# Patient Record
Sex: FEMALE | Race: WHITE | Employment: FULL TIME | URBAN - METROPOLITAN AREA
[De-identification: names, ages, dates, MRNs, and addresses within clinical notes are randomized per-mention and may not be internally consistent; named-entity substitution may affect disease eponyms.]

---

## 2019-02-26 ENCOUNTER — HOSPITAL ENCOUNTER (EMERGENCY)
Age: 64
Discharge: HOME | End: 2019-02-26
Attending: EMERGENCY MEDICINE
Payer: COMMERCIAL

## 2019-02-26 VITALS
HEIGHT: 66 IN | SYSTOLIC BLOOD PRESSURE: 180 MMHG | OXYGEN SATURATION: 98 % | RESPIRATION RATE: 17 BRPM | BODY MASS INDEX: 30.53 KG/M2 | WEIGHT: 190 LBS | TEMPERATURE: 97.5 F | DIASTOLIC BLOOD PRESSURE: 98 MMHG | HEART RATE: 88 BPM

## 2019-02-26 DIAGNOSIS — I10 HYPERTENSION, UNSPECIFIED TYPE: Primary | ICD-10-CM

## 2019-02-26 LAB
ALBUMIN SERPL-MCNC: 4.4 GM/DL (ref 3.4–5)
ALP BLD-CCNC: 103 IU/L (ref 40–129)
ALT SERPL-CCNC: 18 U/L (ref 10–40)
ANION GAP SERPL CALCULATED.3IONS-SCNC: 13 MMOL/L (ref 4–16)
AST SERPL-CCNC: 14 IU/L (ref 15–37)
BACTERIA: ABNORMAL /HPF
BASOPHILS ABSOLUTE: 0 K/CU MM
BASOPHILS RELATIVE PERCENT: 0.6 % (ref 0–1)
BILIRUB SERPL-MCNC: 0.3 MG/DL (ref 0–1)
BILIRUBIN URINE: NEGATIVE MG/DL
BLOOD, URINE: NEGATIVE
BUN BLDV-MCNC: 14 MG/DL (ref 6–23)
CALCIUM SERPL-MCNC: 8.9 MG/DL (ref 8.3–10.6)
CHLORIDE BLD-SCNC: 102 MMOL/L (ref 99–110)
CLARITY: CLEAR
CO2: 26 MMOL/L (ref 21–32)
COLOR: ABNORMAL
CREAT SERPL-MCNC: 0.8 MG/DL (ref 0.6–1.1)
DIFFERENTIAL TYPE: ABNORMAL
EOSINOPHILS ABSOLUTE: 0.3 K/CU MM
EOSINOPHILS RELATIVE PERCENT: 4.3 % (ref 0–3)
GFR AFRICAN AMERICAN: >60 ML/MIN/1.73M2
GFR NON-AFRICAN AMERICAN: >60 ML/MIN/1.73M2
GLUCOSE BLD-MCNC: 117 MG/DL (ref 70–99)
GLUCOSE, URINE: NEGATIVE MG/DL
HCT VFR BLD CALC: 47.9 % (ref 37–47)
HEMOGLOBIN: 14.9 GM/DL (ref 12.5–16)
IMMATURE NEUTROPHIL %: 0.3 % (ref 0–0.43)
KETONES, URINE: NEGATIVE MG/DL
LEUKOCYTE ESTERASE, URINE: NEGATIVE
LYMPHOCYTES ABSOLUTE: 2.1 K/CU MM
LYMPHOCYTES RELATIVE PERCENT: 30.3 % (ref 24–44)
MCH RBC QN AUTO: 30.3 PG (ref 27–31)
MCHC RBC AUTO-ENTMCNC: 31.1 % (ref 32–36)
MCV RBC AUTO: 97.4 FL (ref 78–100)
MONOCYTES ABSOLUTE: 0.3 K/CU MM
MONOCYTES RELATIVE PERCENT: 4.6 % (ref 0–4)
NITRITE URINE, QUANTITATIVE: NEGATIVE
NUCLEATED RBC %: 0 %
PDW BLD-RTO: 12.8 % (ref 11.7–14.9)
PH, URINE: 6 (ref 5–8)
PLATELET # BLD: 240 K/CU MM (ref 140–440)
PMV BLD AUTO: 10.4 FL (ref 7.5–11.1)
POTASSIUM SERPL-SCNC: 3.6 MMOL/L (ref 3.5–5.1)
PROTEIN UA: NEGATIVE MG/DL
RBC # BLD: 4.92 M/CU MM (ref 4.2–5.4)
RBC URINE: ABNORMAL /HPF (ref 0–6)
SEGMENTED NEUTROPHILS ABSOLUTE COUNT: 4.1 K/CU MM
SEGMENTED NEUTROPHILS RELATIVE PERCENT: 59.9 % (ref 36–66)
SODIUM BLD-SCNC: 141 MMOL/L (ref 135–145)
SPECIFIC GRAVITY UA: 1 (ref 1–1.03)
SQUAMOUS EPITHELIAL: <1 /HPF
TOTAL IMMATURE NEUTOROPHIL: 0.02 K/CU MM
TOTAL NUCLEATED RBC: 0 K/CU MM
TOTAL PROTEIN: 7.1 GM/DL (ref 6.4–8.2)
TRICHOMONAS: ABNORMAL /HPF
UROBILINOGEN, URINE: NORMAL MG/DL (ref 0.2–1)
WBC # BLD: 6.9 K/CU MM (ref 4–10.5)
WBC UA: ABNORMAL /HPF (ref 0–5)

## 2019-02-26 PROCEDURE — 93010 ELECTROCARDIOGRAM REPORT: CPT | Performed by: INTERNAL MEDICINE

## 2019-02-26 PROCEDURE — 85025 COMPLETE CBC W/AUTO DIFF WBC: CPT

## 2019-02-26 PROCEDURE — 80053 COMPREHEN METABOLIC PANEL: CPT

## 2019-02-26 PROCEDURE — 99284 EMERGENCY DEPT VISIT MOD MDM: CPT

## 2019-02-26 PROCEDURE — 93005 ELECTROCARDIOGRAM TRACING: CPT | Performed by: EMERGENCY MEDICINE

## 2019-02-26 PROCEDURE — 81001 URINALYSIS AUTO W/SCOPE: CPT

## 2019-02-26 PROCEDURE — 36415 COLL VENOUS BLD VENIPUNCTURE: CPT

## 2019-02-26 RX ORDER — METOPROLOL SUCCINATE 25 MG/1
25 TABLET, EXTENDED RELEASE ORAL DAILY
Qty: 30 TABLET | Refills: 0 | Status: SHIPPED | OUTPATIENT
Start: 2019-02-26 | End: 2022-04-25

## 2019-03-04 LAB
EKG ATRIAL RATE: 114 BPM
EKG DIAGNOSIS: NORMAL
EKG P AXIS: 47 DEGREES
EKG P-R INTERVAL: 152 MS
EKG Q-T INTERVAL: 348 MS
EKG QRS DURATION: 94 MS
EKG QTC CALCULATION (BAZETT): 479 MS
EKG R AXIS: 47 DEGREES
EKG T AXIS: 61 DEGREES
EKG VENTRICULAR RATE: 114 BPM

## 2019-08-11 ENCOUNTER — APPOINTMENT (OUTPATIENT)
Dept: CT IMAGING | Age: 64
End: 2019-08-11
Payer: COMMERCIAL

## 2019-08-11 ENCOUNTER — HOSPITAL ENCOUNTER (EMERGENCY)
Age: 64
Discharge: HOME | End: 2019-08-11
Attending: EMERGENCY MEDICINE
Payer: COMMERCIAL

## 2019-08-11 VITALS
HEART RATE: 100 BPM | HEIGHT: 65 IN | DIASTOLIC BLOOD PRESSURE: 77 MMHG | TEMPERATURE: 97.9 F | RESPIRATION RATE: 18 BRPM | SYSTOLIC BLOOD PRESSURE: 142 MMHG | BODY MASS INDEX: 31.65 KG/M2 | OXYGEN SATURATION: 97 % | WEIGHT: 190 LBS

## 2019-08-11 DIAGNOSIS — R10.30 LOWER ABDOMINAL PAIN: Primary | ICD-10-CM

## 2019-08-11 LAB
ALBUMIN SERPL-MCNC: 4.2 GM/DL (ref 3.4–5)
ALP BLD-CCNC: 103 IU/L (ref 40–129)
ALT SERPL-CCNC: 19 U/L (ref 10–40)
ANION GAP SERPL CALCULATED.3IONS-SCNC: 11 MMOL/L (ref 4–16)
AST SERPL-CCNC: 15 IU/L (ref 15–37)
BACTERIA: NEGATIVE /HPF
BASOPHILS ABSOLUTE: 0 K/CU MM
BASOPHILS RELATIVE PERCENT: 0.3 % (ref 0–1)
BILIRUB SERPL-MCNC: 0.5 MG/DL (ref 0–1)
BILIRUBIN URINE: NEGATIVE MG/DL
BLOOD, URINE: NEGATIVE
BUN BLDV-MCNC: 11 MG/DL (ref 6–23)
CALCIUM SERPL-MCNC: 9.5 MG/DL (ref 8.3–10.6)
CHLORIDE BLD-SCNC: 103 MMOL/L (ref 99–110)
CLARITY: CLEAR
CO2: 24 MMOL/L (ref 21–32)
COLOR: ABNORMAL
CREAT SERPL-MCNC: 0.6 MG/DL (ref 0.6–1.1)
DIFFERENTIAL TYPE: ABNORMAL
EOSINOPHILS ABSOLUTE: 0.1 K/CU MM
EOSINOPHILS RELATIVE PERCENT: 1.2 % (ref 0–3)
GFR AFRICAN AMERICAN: >60 ML/MIN/1.73M2
GFR NON-AFRICAN AMERICAN: >60 ML/MIN/1.73M2
GLUCOSE BLD-MCNC: 111 MG/DL (ref 70–99)
GLUCOSE, URINE: NEGATIVE MG/DL
HCT VFR BLD CALC: 42.4 % (ref 37–47)
HEMOGLOBIN: 13.7 GM/DL (ref 12.5–16)
IMMATURE NEUTROPHIL %: 0.2 % (ref 0–0.43)
KETONES, URINE: NEGATIVE MG/DL
LEUKOCYTE ESTERASE, URINE: NEGATIVE
LIPASE: 90 IU/L (ref 13–60)
LYMPHOCYTES ABSOLUTE: 1.3 K/CU MM
LYMPHOCYTES RELATIVE PERCENT: 14.6 % (ref 24–44)
MCH RBC QN AUTO: 30.3 PG (ref 27–31)
MCHC RBC AUTO-ENTMCNC: 32.3 % (ref 32–36)
MCV RBC AUTO: 93.8 FL (ref 78–100)
MONOCYTES ABSOLUTE: 0.5 K/CU MM
MONOCYTES RELATIVE PERCENT: 5.1 % (ref 0–4)
NITRITE URINE, QUANTITATIVE: NEGATIVE
NUCLEATED RBC %: 0 %
PDW BLD-RTO: 13.1 % (ref 11.7–14.9)
PH, URINE: 6 (ref 5–8)
PLATELET # BLD: 206 K/CU MM (ref 140–440)
PMV BLD AUTO: 10.2 FL (ref 7.5–11.1)
POTASSIUM SERPL-SCNC: 3.6 MMOL/L (ref 3.5–5.1)
PROTEIN UA: NEGATIVE MG/DL
RBC # BLD: 4.52 M/CU MM (ref 4.2–5.4)
RBC URINE: ABNORMAL /HPF (ref 0–6)
SEGMENTED NEUTROPHILS ABSOLUTE COUNT: 7.1 K/CU MM
SEGMENTED NEUTROPHILS RELATIVE PERCENT: 78.6 % (ref 36–66)
SODIUM BLD-SCNC: 138 MMOL/L (ref 135–145)
SPECIFIC GRAVITY UA: 1.01 (ref 1–1.03)
SQUAMOUS EPITHELIAL: 1 /HPF
TOTAL IMMATURE NEUTOROPHIL: 0.02 K/CU MM
TOTAL NUCLEATED RBC: 0 K/CU MM
TOTAL PROTEIN: 6.9 GM/DL (ref 6.4–8.2)
TRICHOMONAS: ABNORMAL /HPF
UROBILINOGEN, URINE: NORMAL MG/DL (ref 0.2–1)
WBC # BLD: 9 K/CU MM (ref 4–10.5)
WBC UA: <1 /HPF (ref 0–5)

## 2019-08-11 PROCEDURE — 83690 ASSAY OF LIPASE: CPT

## 2019-08-11 PROCEDURE — 74176 CT ABD & PELVIS W/O CONTRAST: CPT

## 2019-08-11 PROCEDURE — 85025 COMPLETE CBC W/AUTO DIFF WBC: CPT

## 2019-08-11 PROCEDURE — 81001 URINALYSIS AUTO W/SCOPE: CPT

## 2019-08-11 PROCEDURE — 99284 EMERGENCY DEPT VISIT MOD MDM: CPT

## 2019-08-11 PROCEDURE — 80053 COMPREHEN METABOLIC PANEL: CPT

## 2019-08-11 RX ORDER — DICYCLOMINE HYDROCHLORIDE 10 MG/1
10 CAPSULE ORAL EVERY 6 HOURS PRN
Qty: 20 CAPSULE | Refills: 0 | Status: SHIPPED | OUTPATIENT
Start: 2019-08-11 | End: 2022-04-25

## 2019-08-11 RX ORDER — HYDROCODONE BITARTRATE AND ACETAMINOPHEN 5; 325 MG/1; MG/1
1 TABLET ORAL EVERY 6 HOURS PRN
Qty: 5 TABLET | Refills: 0 | Status: SHIPPED | OUTPATIENT
Start: 2019-08-11 | End: 2019-08-14

## 2019-08-11 ASSESSMENT — PAIN DESCRIPTION - PAIN TYPE: TYPE: ACUTE PAIN

## 2019-08-11 ASSESSMENT — PAIN SCALES - GENERAL: PAINLEVEL_OUTOF10: 9

## 2019-08-11 ASSESSMENT — PAIN DESCRIPTION - LOCATION: LOCATION: ABDOMEN

## 2019-08-12 NOTE — ED PROVIDER NOTES
m) 190 lb (86.2 kg)       Physical Exam  General appearance: Alert, cooperative, no distress, appears stated age. Head:  Normocephalic, without obvious abnormality, atraumatic. HEENT: Mucous membranes moist.  Neck: Full ROM, trachea midline, no JVD  Lungs: No respiratory distress  Cardiovasular: Perfusing extremities  Abdomen: Mild lower midline, left, right tenderness palpation without rebound, mild. No pulsatile masses. Extremities: Atraumatic, full ROM  Skin: No rashes or lesions to exposed skin  Neurologic: Alert and oriented x3, motor grossly normal, clear speech      DIAGNOSTIC RESULTS     EKG:     RADIOLOGY:   Non-plain film images such as CT, Ultrasound and MRI are read by the radiologist.Plain radiographic images are visualized and preliminarily interpreted by the emergency physician with the below findings:        Interpretation per the Radiologist below, if available at the time of this note:    CT ABDOMEN PELVIS WO CONTRAST   Preliminary Result   1. No acute findings within the abdomen or pelvis. No evidence of   obstructive uropathy or appendicitis. Scattered diverticulosis without acute   features. 2. Previous hysterectomy.                EDBEDSIDE ULTRASOUND:   Performed by Lucy Nath - none    LABS:  Labs Reviewed   CBC WITH AUTO DIFFERENTIAL - Abnormal; Notable for the following components:       Result Value    Segs Relative 78.6 (*)     Lymphocytes % 14.6 (*)     Monocytes % 5.1 (*)     All other components within normal limits   COMPREHENSIVE METABOLIC PANEL W/ REFLEX TO MG FOR LOW K - Abnormal; Notable for the following components:    Glucose 111 (*)     All other components within normal limits   LIPASE - Abnormal; Notable for the following components:    Lipase 90 (*)     All other components within normal limits   URINE RT REFLEX TO CULTURE - Abnormal; Notable for the following components:    Color, UA STRAW (*)     All other components within normal limits       All other labs were

## 2020-03-15 ENCOUNTER — HOSPITAL ENCOUNTER (EMERGENCY)
Age: 65
Discharge: HOME | End: 2020-03-15

## 2020-03-15 VITALS
DIASTOLIC BLOOD PRESSURE: 89 MMHG | HEART RATE: 87 BPM | RESPIRATION RATE: 17 BRPM | TEMPERATURE: 98.9 F | SYSTOLIC BLOOD PRESSURE: 149 MMHG | OXYGEN SATURATION: 98 %

## 2020-03-15 PROCEDURE — 99282 EMERGENCY DEPT VISIT SF MDM: CPT

## 2020-03-15 PROCEDURE — 6370000000 HC RX 637 (ALT 250 FOR IP): Performed by: PHYSICIAN ASSISTANT

## 2020-03-15 RX ORDER — PREDNISONE 20 MG/1
60 TABLET ORAL ONCE
Status: COMPLETED | OUTPATIENT
Start: 2020-03-15 | End: 2020-03-15

## 2020-03-15 RX ORDER — METHYLPREDNISOLONE 4 MG/1
TABLET ORAL
Qty: 1 KIT | Refills: 0 | Status: SHIPPED | OUTPATIENT
Start: 2020-03-15 | End: 2020-03-21

## 2020-03-15 RX ORDER — VALACYCLOVIR HYDROCHLORIDE 1 G/1
1000 TABLET, FILM COATED ORAL 3 TIMES DAILY
Qty: 21 TABLET | Refills: 0 | Status: SHIPPED | OUTPATIENT
Start: 2020-03-15 | End: 2020-03-22

## 2020-03-15 RX ORDER — LIDOCAINE 50 MG/G
1 PATCH TOPICAL DAILY
Qty: 10 PATCH | Refills: 0 | Status: SHIPPED | OUTPATIENT
Start: 2020-03-15 | End: 2020-03-25

## 2020-03-15 RX ADMIN — PREDNISONE 60 MG: 20 TABLET ORAL at 18:59

## 2020-03-15 ASSESSMENT — PAIN DESCRIPTION - ORIENTATION: ORIENTATION: RIGHT;UPPER

## 2020-03-15 ASSESSMENT — PAIN DESCRIPTION - LOCATION: LOCATION: BACK

## 2020-03-15 ASSESSMENT — PAIN DESCRIPTION - PAIN TYPE: TYPE: ACUTE PAIN

## 2020-03-15 ASSESSMENT — PAIN DESCRIPTION - DESCRIPTORS: DESCRIPTORS: BURNING

## 2020-03-15 ASSESSMENT — PAIN SCALES - GENERAL: PAINLEVEL_OUTOF10: 6

## 2020-03-15 NOTE — ED PROVIDER NOTES
EMERGENCY DEPARTMENT ENCOUNTER      PCP: [address] [place] [address] Drive    Chief Complaint   Patient presents with    Rash     to uppper back          This patient was not evaluated by the attending physician. I have independently evaluated this patient . HPI    Lexi Kaur is a 75 y.o. female who presents to emergency department today with concern of a itching, painful rash to the right upper back. States that over the last several days she has had a itchy feeling and \"gnawing\" feeling to the back. Is now manifest into a maculopapular rash into the right upper back. States that there was a component \"underneath her breast\". 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 She is in no history of shingles outbreak. Denies any immunocompromise. Is not a diabetic. No other component of the rash. No other symptoms. Did state that she had a upper respiratory viral illness several weeks ago but has since gotten better. REVIEW OF SYSTEMS    General: Denies fevers or syncope  ENT: Denies throat swelling or tongue swelling  Pulmonary: Denies wheezes, difficulty breathing,  or chest tightness  Skin: See HPI    All other review of systems are negative  See HPI and nursing notes for additional information     PAST MEDICAL & SURGICAL HISTORY    Past Medical History:   Diagnosis Date    Allergic rhinitis     Obesity      Past Surgical History:   Procedure Laterality Date    HYSTERECTOMY, TOTAL ABDOMINAL      TONSILLECTOMY         CURRENT MEDICATIONS    Current Outpatient Rx   Medication Sig Dispense Refill    valACYclovir (VALTREX) 1 g tablet Take 1 tablet by mouth 3 times daily for 7 days 21 tablet 0    methylPREDNISolone (MEDROL DOSEPACK) 4 MG tablet Take by mouth. 1 kit 0    lidocaine (LIDODERM) 5 % Place 1 patch onto the skin daily for 10 days 12 hours on, 12 hours off.  10 patch 0    dicyclomine (BENTYL) 10 MG capsule Take 1 capsule by mouth every 6 hours as needed (cramps) 20 capsule 0    metoprolol succinate (TOPROL XL) 25 MG extended release tablet Take 1 tablet by mouth daily 30 tablet 0    naproxen (NAPROSYN) 500 MG tablet Take 1 tablet by mouth 2 times daily as needed for Pain 30 tablet 0    celecoxib (CELEBREX) 200 MG capsule Take 1 capsule by mouth daily.  30 capsule 5       ALLERGIES    Allergies   Allergen Reactions    Pcn [Penicillins] Swelling and Rash    Sulfa Antibiotics Rash       SOCIAL & FAMILY HISTORY    Social History     Socioeconomic History    Marital status:      Spouse name: None    Number of children: None    Years of education: None    Highest education level: None   Occupational History    None   Social Needs    Financial resource strain: None    Food insecurity     Worry: None     Inability: None    Transportation needs     Medical: None     Non-medical: None   Tobacco Use    Smoking status: Former Smoker     Packs/day: 0.50     Years: 20.00     Pack years: 10.00     Types: Cigarettes     Last attempt to quit: 6/10/1991     Years since quittin.7    Smokeless tobacco: Never Used   Substance and Sexual Activity    Alcohol use: No    Drug use: No    Sexual activity: None   Lifestyle    Physical activity     Days per week: None     Minutes per session: None    Stress: None   Relationships    Social connections     Talks on phone: None     Gets together: None     Attends Scientologist service: None     Active member of club or organization: None     Attends meetings of clubs or organizations: None     Relationship status: None    Intimate partner violence     Fear of current or ex partner: None     Emotionally abused: None     Physically abused: None     Forced sexual activity: None   Other Topics Concern    None   Social History Narrative    None     Family History   Problem Relation Age of Onset    Cancer Other         aunt    Diabetes Mother     Hypertension Mother     Heart

## 2021-12-07 ENCOUNTER — HOSPITAL ENCOUNTER (EMERGENCY)
Age: 66
Discharge: HOME | End: 2021-12-07
Attending: EMERGENCY MEDICINE
Payer: COMMERCIAL

## 2021-12-07 VITALS
BODY MASS INDEX: 30.53 KG/M2 | SYSTOLIC BLOOD PRESSURE: 120 MMHG | RESPIRATION RATE: 15 BRPM | WEIGHT: 190 LBS | TEMPERATURE: 98.1 F | HEART RATE: 90 BPM | DIASTOLIC BLOOD PRESSURE: 96 MMHG | OXYGEN SATURATION: 96 % | HEIGHT: 66 IN

## 2021-12-07 DIAGNOSIS — B34.9 VIRAL ILLNESS: ICD-10-CM

## 2021-12-07 DIAGNOSIS — H65.01 RIGHT ACUTE SEROUS OTITIS MEDIA, RECURRENCE NOT SPECIFIED: Primary | ICD-10-CM

## 2021-12-07 PROCEDURE — 87430 STREP A AG IA: CPT

## 2021-12-07 PROCEDURE — U0005 INFEC AGEN DETEC AMPLI PROBE: HCPCS

## 2021-12-07 PROCEDURE — 87081 CULTURE SCREEN ONLY: CPT

## 2021-12-07 PROCEDURE — U0003 INFECTIOUS AGENT DETECTION BY NUCLEIC ACID (DNA OR RNA); SEVERE ACUTE RESPIRATORY SYNDROME CORONAVIRUS 2 (SARS-COV-2) (CORONAVIRUS DISEASE [COVID-19]), AMPLIFIED PROBE TECHNIQUE, MAKING USE OF HIGH THROUGHPUT TECHNOLOGIES AS DESCRIBED BY CMS-2020-01-R: HCPCS

## 2021-12-07 PROCEDURE — 99284 EMERGENCY DEPT VISIT MOD MDM: CPT

## 2021-12-07 RX ORDER — CEFDINIR 300 MG/1
600 CAPSULE ORAL DAILY
Qty: 14 CAPSULE | Refills: 0 | Status: SHIPPED | OUTPATIENT
Start: 2021-12-07 | End: 2021-12-14

## 2021-12-07 NOTE — ED TRIAGE NOTES
Pt arrived via triage with c/o body aches, sore throat and cough. Pt reports symptoms since Thursday. Pt reports she is not vaccinated against Covid 19. Pt is alert, oriented and ambulatory. Pt reports taking OTC meds.

## 2021-12-07 NOTE — ED PROVIDER NOTES
Triage Chief Complaint:   URI      [place]:  Katherleen Pallas is a 74 y.o. female that presents to the emergency department with chills that started 5 days ago. She then developed a hoarse and irritated throat and right ear pain. She eventually lost her voice but it is back now. She used a friends nebulizer machine two days ago that \"got my voice back. \" She developed a fever yesterday and has been taking tylenol since then. No drainage out of the right ear. No chest pain or shortness of breath.  .    Past Medical History:   Diagnosis Date    Allergic rhinitis     Obesity      Past Surgical History:   Procedure Laterality Date    HYSTERECTOMY, TOTAL ABDOMINAL      TONSILLECTOMY       Family History   Problem Relation Age of Onset    Cancer Other         aunt    Diabetes Mother     Hypertension Mother     Heart Disease Father     Heart Disease Brother     Heart Disease Other         grandmother    Hypertension Brother     Hypertension Son     Other Brother         chronic lung disease    Other Son         chronic lung disease    Elevated Lipids Brother     Elevated Lipids Son      Social History     Socioeconomic History    Marital status:      Spouse name: Not on file    Number of children: Not on file    Years of education: Not on file    Highest education level: Not on file   Occupational History    Not on file   Tobacco Use    Smoking status: Former Smoker     Packs/day: 0.50     Years: 20.00     Pack years: 10.00     Types: Cigarettes     Quit date: 6/10/1991     Years since quittin.5    Smokeless tobacco: Never Used   Substance and Sexual Activity    Alcohol use: No    Drug use: No    Sexual activity: Not on file   Other Topics Concern    Not on file   Social History Narrative    Not on file     Social Determinants of Health     Financial Resource Strain:     Difficulty of Paying Living Expenses: Not on file   Food Insecurity:     Worried About Running Out of Food in the Last Year: Not on file    Ran Out of Food in the Last Year: Not on file   Transportation Needs:     Lack of Transportation (Medical): Not on file    Lack of Transportation (Non-Medical): Not on file   Physical Activity:     Days of Exercise per Week: Not on file    Minutes of Exercise per Session: Not on file   Stress:     Feeling of Stress : Not on file   Social Connections:     Frequency of Communication with Friends and Family: Not on file    Frequency of Social Gatherings with Friends and Family: Not on file    Attends Lutheran Services: Not on file    Active Member of [address] [place] [address] [employer] or Organizations: Not on file    Attends Club or Organization Meetings: Not on file    Marital Status: Not on file   Intimate Partner Violence:     Fear of Current or Ex-Partner: Not on file    Emotionally Abused: Not on file    Physically Abused: Not on file    Sexually Abused: Not on file   Housing Stability:     Unable to Pay for Housing in the Last Year: Not on file    Number of [place] in the Last Year: Not on file    Unstable Housing in the Last Year: Not on file     No current facility-administered medications for this encounter. Current Outpatient Medications   Medication Sig Dispense Refill    dicyclomine (BENTYL) 10 MG capsule Take 1 capsule by mouth every 6 hours as needed (cramps) 20 capsule 0    metoprolol succinate (TOPROL XL) 25 MG extended release tablet Take 1 tablet by mouth daily 30 tablet 0    naproxen (NAPROSYN) 500 MG tablet Take 1 tablet by mouth 2 times daily as needed for Pain 30 tablet 0    celecoxib (CELEBREX) 200 MG capsule Take 1 capsule by mouth daily. 30 capsule 5     Allergies   Allergen Reactions    Pcn [Penicillins] Swelling and Rash    Sulfa Antibiotics Rash     Nursing Notes Reviewed    ROS:  At least 10 systems reviewed and otherwise negative except as in the [address]     Physical Exam:  ED Triage Vitals [12/07/21 1659]   Enc Vitals Group      BP (!) 120/96      Pulse 90 Resp 15      Temp 98.1 °F (36.7 °C)      Temp src       SpO2 96 %      Weight 190 lb (86.2 kg)      Height 5' 6\" (1.676 m)      Head Circumference       Peak Flow       Pain Score       Pain Loc       Pain Edu? Excl. in [address]? My pulse oximetry interpretation is which is within the normal range    GENERAL APPEARANCE: Awake and alert. Cooperative. No acute distress. HEAD:  Atraumatic. EYES: EOM's grossly intact. ENT: Mucous membranes are moist.  No trismus. Left TM unremarkable. Right TM bulging with erythema in the canal.  Oropharynx clear. Uvula midline. NECK:  Trachea midline. HEART: RRR. Radial pulses 2+. LUNGS: Respirations unlabored. CTAB  ABDOMEN: Soft. Non-tender. No guarding or rebound. EXTREMITIES: No acute deformities. SKIN: Warm and dry. NEUROLOGICAL: No gross facial drooping. Moves all 4 extremities spontaneously. PSYCHIATRIC: Normal mood. I have reviewed and interpreted all of the currently available lab results from this visit (if applicable):  No results found for this visit on 12/07/21. EKG: (All EKG's are interpreted by myself in the absence of a cardiologist)      MDM:  Patient's vital signs are stable. She is awake and alert and in no acute distress. I did obtain a rapid strep screen given her sore throat. This is negative. I did also send out a Covid test.  Patient instructed that this will return in the next 1 to 2 days and she should isolate until this test result returns. She does appear to have an acute otitis media and I will start her on an antibiotic for this. Patient instructed to continue Tylenol as needed for fever and body aches and follow-up in the next few days. Clinical Impression:  1. Right acute serous otitis media, recurrence not specified    2. Viral illness        Disposition Vitals:  [unfilled], [unfilled], [unfilled], [unfilled]    Disposition referral (if applicable):  Robert Vera MD  [address] [place]    Schedule an appointment as soon as possible for a visit in 2 days  If symptoms worsen      Disposition medications (if applicable):  New Prescriptions    No medications on file         (Please note that portions of this note may have been completed with a voice recognition program. Efforts were made to edit the dictations but occasionally words are mis-transcribed.)    MD Monie Forbes MD  12/07/21 [id number]

## 2021-12-07 NOTE — Clinical Note
Waldemar Trejo was seen and treated in our emergency department on 12/7/2021. She may return to work on 12/09/2021. May return to work on Thursday if COVID test is negative. If you have any questions or concerns, please don't hesitate to call.       Monie Michael MD

## 2021-12-07 NOTE — Clinical Note
Yarely Anaya was seen and treated in our emergency department on 12/7/2021. She may return to work on 12/09/2021. May return to work on Thursday if COVID test is negative. If you have any questions or concerns, please don't hesitate to call.       Ledy Francois MD

## 2021-12-08 LAB — SARS-COV-2: NOT DETECTED

## 2021-12-09 LAB
CULTURE: NORMAL
Lab: NORMAL
SPECIMEN: NORMAL
STREP A DIRECT SCREEN: NEGATIVE

## 2022-04-25 ENCOUNTER — HOSPITAL ENCOUNTER (OUTPATIENT)
Age: 67
Setting detail: OBSERVATION
Discharge: HOME | End: 2022-04-27
Attending: STUDENT IN AN ORGANIZED HEALTH CARE EDUCATION/TRAINING PROGRAM | Admitting: STUDENT IN AN ORGANIZED HEALTH CARE EDUCATION/TRAINING PROGRAM
Payer: COMMERCIAL

## 2022-04-25 ENCOUNTER — APPOINTMENT (OUTPATIENT)
Dept: GENERAL RADIOLOGY | Age: 67
End: 2022-04-25
Payer: COMMERCIAL

## 2022-04-25 ENCOUNTER — APPOINTMENT (OUTPATIENT)
Dept: CT IMAGING | Age: 67
End: 2022-04-25
Payer: COMMERCIAL

## 2022-04-25 DIAGNOSIS — I16.0 HYPERTENSIVE URGENCY: ICD-10-CM

## 2022-04-25 DIAGNOSIS — R42 LIGHTHEADEDNESS: ICD-10-CM

## 2022-04-25 DIAGNOSIS — R07.9 CHEST PAIN, UNSPECIFIED TYPE: Primary | ICD-10-CM

## 2022-04-25 DIAGNOSIS — R00.0 TACHYCARDIA: ICD-10-CM

## 2022-04-25 DIAGNOSIS — R51.9 ACUTE NONINTRACTABLE HEADACHE, UNSPECIFIED HEADACHE TYPE: ICD-10-CM

## 2022-04-25 PROBLEM — I10 PRIMARY HYPERTENSION: Status: ACTIVE | Noted: 2022-04-25

## 2022-04-25 LAB
ALBUMIN SERPL-MCNC: 4.4 GM/DL (ref 3.4–5)
ALP BLD-CCNC: 113 IU/L (ref 40–129)
ALT SERPL-CCNC: 24 U/L (ref 10–40)
ANION GAP SERPL CALCULATED.3IONS-SCNC: 13 MMOL/L (ref 4–16)
AST SERPL-CCNC: 21 IU/L (ref 15–37)
BACTERIA: NEGATIVE /HPF
BASOPHILS ABSOLUTE: 0.1 K/CU MM
BASOPHILS RELATIVE PERCENT: 0.7 % (ref 0–1)
BILIRUB SERPL-MCNC: 0.4 MG/DL (ref 0–1)
BILIRUBIN URINE: NEGATIVE MG/DL
BLOOD, URINE: NEGATIVE
BUN BLDV-MCNC: 10 MG/DL (ref 6–23)
CALCIUM SERPL-MCNC: 9.2 MG/DL (ref 8.3–10.6)
CHLORIDE BLD-SCNC: 103 MMOL/L (ref 99–110)
CLARITY: CLEAR
CO2: 23 MMOL/L (ref 21–32)
COLOR: YELLOW
CREAT SERPL-MCNC: 0.7 MG/DL (ref 0.6–1.1)
DIFFERENTIAL TYPE: ABNORMAL
EKG ATRIAL RATE: 112 BPM
EKG DIAGNOSIS: NORMAL
EKG P AXIS: 51 DEGREES
EKG P-R INTERVAL: 150 MS
EKG Q-T INTERVAL: 358 MS
EKG QRS DURATION: 86 MS
EKG QTC CALCULATION (BAZETT): 488 MS
EKG R AXIS: -65 DEGREES
EKG T AXIS: 52 DEGREES
EKG VENTRICULAR RATE: 112 BPM
EOSINOPHILS ABSOLUTE: 0.2 K/CU MM
EOSINOPHILS RELATIVE PERCENT: 3.1 % (ref 0–3)
GFR AFRICAN AMERICAN: >60 ML/MIN/1.73M2
GFR NON-AFRICAN AMERICAN: >60 ML/MIN/1.73M2
GLUCOSE BLD-MCNC: 108 MG/DL (ref 70–99)
GLUCOSE, URINE: NEGATIVE MG/DL
HCT VFR BLD CALC: 47.6 % (ref 37–47)
HEMOGLOBIN: 15.4 GM/DL (ref 12.5–16)
IMMATURE NEUTROPHIL %: 0.4 % (ref 0–0.43)
KETONES, URINE: NEGATIVE MG/DL
LEUKOCYTE ESTERASE, URINE: NEGATIVE
LIPASE: 41 IU/L (ref 13–60)
LYMPHOCYTES ABSOLUTE: 2.1 K/CU MM
LYMPHOCYTES RELATIVE PERCENT: 31.4 % (ref 24–44)
MAGNESIUM: 2.1 MG/DL (ref 1.8–2.4)
MCH RBC QN AUTO: 31 PG (ref 27–31)
MCHC RBC AUTO-ENTMCNC: 32.4 % (ref 32–36)
MCV RBC AUTO: 95.8 FL (ref 78–100)
MONOCYTES ABSOLUTE: 0.4 K/CU MM
MONOCYTES RELATIVE PERCENT: 5.2 % (ref 0–4)
NITRITE URINE, QUANTITATIVE: NEGATIVE
NUCLEATED RBC %: 0 %
PDW BLD-RTO: 12.9 % (ref 11.7–14.9)
PH, URINE: 6 (ref 5–8)
PLATELET # BLD: 248 K/CU MM (ref 140–440)
PMV BLD AUTO: 10.1 FL (ref 7.5–11.1)
POTASSIUM SERPL-SCNC: 3.5 MMOL/L (ref 3.5–5.1)
PRO-BNP: 57.3 PG/ML
PROTEIN UA: NEGATIVE MG/DL
RBC # BLD: 4.97 M/CU MM (ref 4.2–5.4)
RBC URINE: <1 /HPF (ref 0–6)
SEGMENTED NEUTROPHILS ABSOLUTE COUNT: 4 K/CU MM
SEGMENTED NEUTROPHILS RELATIVE PERCENT: 59.2 % (ref 36–66)
SODIUM BLD-SCNC: 139 MMOL/L (ref 135–145)
SPECIFIC GRAVITY UA: <1.005 (ref 1–1.03)
SQUAMOUS EPITHELIAL: 1 /HPF
T4 FREE: 1.28 NG/DL (ref 0.9–1.8)
TOTAL IMMATURE NEUTOROPHIL: 0.03 K/CU MM
TOTAL NUCLEATED RBC: 0 K/CU MM
TOTAL PROTEIN: 7.1 GM/DL (ref 6.4–8.2)
TRICHOMONAS: NORMAL /HPF
TROPONIN T: <0.01 NG/ML
TSH HIGH SENSITIVITY: 1.96 UIU/ML (ref 0.27–4.2)
UROBILINOGEN, URINE: 0.2 MG/DL (ref 0.2–1)
WBC # BLD: 6.8 K/CU MM (ref 4–10.5)
WBC UA: <1 /HPF (ref 0–5)

## 2022-04-25 PROCEDURE — 85025 COMPLETE CBC W/AUTO DIFF WBC: CPT

## 2022-04-25 PROCEDURE — 6370000000 HC RX 637 (ALT 250 FOR IP): Performed by: INTERNAL MEDICINE

## 2022-04-25 PROCEDURE — 2580000003 HC RX 258: Performed by: PHYSICIAN ASSISTANT

## 2022-04-25 PROCEDURE — 84443 ASSAY THYROID STIM HORMONE: CPT

## 2022-04-25 PROCEDURE — 70450 CT HEAD/BRAIN W/O DYE: CPT

## 2022-04-25 PROCEDURE — 96361 HYDRATE IV INFUSION ADD-ON: CPT

## 2022-04-25 PROCEDURE — 6360000002 HC RX W HCPCS: Performed by: PHYSICIAN ASSISTANT

## 2022-04-25 PROCEDURE — 81001 URINALYSIS AUTO W/SCOPE: CPT

## 2022-04-25 PROCEDURE — 96372 THER/PROPH/DIAG INJ SC/IM: CPT

## 2022-04-25 PROCEDURE — 80053 COMPREHEN METABOLIC PANEL: CPT

## 2022-04-25 PROCEDURE — 83735 ASSAY OF MAGNESIUM: CPT

## 2022-04-25 PROCEDURE — 93010 ELECTROCARDIOGRAM REPORT: CPT | Performed by: INTERNAL MEDICINE

## 2022-04-25 PROCEDURE — 94761 N-INVAS EAR/PLS OXIMETRY MLT: CPT

## 2022-04-25 PROCEDURE — 84484 ASSAY OF TROPONIN QUANT: CPT

## 2022-04-25 PROCEDURE — 36415 COLL VENOUS BLD VENIPUNCTURE: CPT

## 2022-04-25 PROCEDURE — 6370000000 HC RX 637 (ALT 250 FOR IP): Performed by: PHYSICIAN ASSISTANT

## 2022-04-25 PROCEDURE — 93005 ELECTROCARDIOGRAM TRACING: CPT | Performed by: STUDENT IN AN ORGANIZED HEALTH CARE EDUCATION/TRAINING PROGRAM

## 2022-04-25 PROCEDURE — 99285 EMERGENCY DEPT VISIT HI MDM: CPT

## 2022-04-25 PROCEDURE — 70496 CT ANGIOGRAPHY HEAD: CPT

## 2022-04-25 PROCEDURE — 71275 CT ANGIOGRAPHY CHEST: CPT

## 2022-04-25 PROCEDURE — G0378 HOSPITAL OBSERVATION PER HR: HCPCS

## 2022-04-25 PROCEDURE — 83880 ASSAY OF NATRIURETIC PEPTIDE: CPT

## 2022-04-25 PROCEDURE — 83690 ASSAY OF LIPASE: CPT

## 2022-04-25 PROCEDURE — 71045 X-RAY EXAM CHEST 1 VIEW: CPT

## 2022-04-25 PROCEDURE — 6360000004 HC RX CONTRAST MEDICATION: Performed by: PHYSICIAN ASSISTANT

## 2022-04-25 PROCEDURE — 99203 OFFICE O/P NEW LOW 30 MIN: CPT | Performed by: INTERNAL MEDICINE

## 2022-04-25 PROCEDURE — 84439 ASSAY OF FREE THYROXINE: CPT

## 2022-04-25 PROCEDURE — 6370000000 HC RX 637 (ALT 250 FOR IP)

## 2022-04-25 PROCEDURE — 96375 TX/PRO/DX INJ NEW DRUG ADDON: CPT

## 2022-04-25 PROCEDURE — 96374 THER/PROPH/DIAG INJ IV PUSH: CPT

## 2022-04-25 RX ORDER — ONDANSETRON 2 MG/ML
4 INJECTION INTRAMUSCULAR; INTRAVENOUS EVERY 6 HOURS PRN
Status: DISCONTINUED | OUTPATIENT
Start: 2022-04-25 | End: 2022-04-27 | Stop reason: HOSPADM

## 2022-04-25 RX ORDER — ACETAMINOPHEN 325 MG/1
650 TABLET ORAL EVERY 6 HOURS PRN
Status: DISCONTINUED | OUTPATIENT
Start: 2022-04-25 | End: 2022-04-27 | Stop reason: HOSPADM

## 2022-04-25 RX ORDER — NITROGLYCERIN 0.4 MG/1
0.4 TABLET SUBLINGUAL EVERY 5 MIN PRN
Status: DISCONTINUED | OUTPATIENT
Start: 2022-04-25 | End: 2022-04-27 | Stop reason: HOSPADM

## 2022-04-25 RX ORDER — ATORVASTATIN CALCIUM 40 MG/1
40 TABLET, FILM COATED ORAL NIGHTLY
Status: DISCONTINUED | OUTPATIENT
Start: 2022-04-25 | End: 2022-04-27 | Stop reason: HOSPADM

## 2022-04-25 RX ORDER — 0.9 % SODIUM CHLORIDE 0.9 %
1000 INTRAVENOUS SOLUTION INTRAVENOUS ONCE
Status: COMPLETED | OUTPATIENT
Start: 2022-04-25 | End: 2022-04-25

## 2022-04-25 RX ORDER — SODIUM CHLORIDE 9 MG/ML
INJECTION, SOLUTION INTRAVENOUS PRN
Status: DISCONTINUED | OUTPATIENT
Start: 2022-04-25 | End: 2022-04-27 | Stop reason: HOSPADM

## 2022-04-25 RX ORDER — SODIUM CHLORIDE 0.9 % (FLUSH) 0.9 %
10 SYRINGE (ML) INJECTION EVERY 12 HOURS SCHEDULED
Status: DISCONTINUED | OUTPATIENT
Start: 2022-04-25 | End: 2022-04-27 | Stop reason: HOSPADM

## 2022-04-25 RX ORDER — ASPIRIN 325 MG
325 TABLET ORAL ONCE
Status: COMPLETED | OUTPATIENT
Start: 2022-04-25 | End: 2022-04-25

## 2022-04-25 RX ORDER — NICOTINE 21 MG/24HR
1 PATCH, TRANSDERMAL 24 HOURS TRANSDERMAL DAILY
Status: DISCONTINUED | OUTPATIENT
Start: 2022-04-25 | End: 2022-04-25

## 2022-04-25 RX ORDER — ENOXAPARIN SODIUM 100 MG/ML
40 INJECTION SUBCUTANEOUS DAILY
Status: DISCONTINUED | OUTPATIENT
Start: 2022-04-25 | End: 2022-04-27 | Stop reason: HOSPADM

## 2022-04-25 RX ORDER — DIPHENHYDRAMINE HYDROCHLORIDE 50 MG/ML
25 INJECTION INTRAMUSCULAR; INTRAVENOUS ONCE
Status: COMPLETED | OUTPATIENT
Start: 2022-04-25 | End: 2022-04-25

## 2022-04-25 RX ORDER — MECLIZINE HCL 12.5 MG/1
12.5 TABLET ORAL 3 TIMES DAILY PRN
Status: DISCONTINUED | OUTPATIENT
Start: 2022-04-25 | End: 2022-04-27 | Stop reason: HOSPADM

## 2022-04-25 RX ORDER — METOCLOPRAMIDE HYDROCHLORIDE 5 MG/ML
10 INJECTION INTRAMUSCULAR; INTRAVENOUS ONCE
Status: COMPLETED | OUTPATIENT
Start: 2022-04-25 | End: 2022-04-25

## 2022-04-25 RX ORDER — PROMETHAZINE HYDROCHLORIDE 25 MG/1
12.5 TABLET ORAL EVERY 6 HOURS PRN
Status: DISCONTINUED | OUTPATIENT
Start: 2022-04-25 | End: 2022-04-27 | Stop reason: HOSPADM

## 2022-04-25 RX ORDER — SODIUM CHLORIDE 0.9 % (FLUSH) 0.9 %
10 SYRINGE (ML) INJECTION PRN
Status: DISCONTINUED | OUTPATIENT
Start: 2022-04-25 | End: 2022-04-27 | Stop reason: HOSPADM

## 2022-04-25 RX ORDER — ASPIRIN 81 MG/1
81 TABLET, CHEWABLE ORAL DAILY
Status: DISCONTINUED | OUTPATIENT
Start: 2022-04-26 | End: 2022-04-27 | Stop reason: HOSPADM

## 2022-04-25 RX ORDER — 0.9 % SODIUM CHLORIDE 0.9 %
10 VIAL (ML) INJECTION
Status: COMPLETED | OUTPATIENT
Start: 2022-04-25 | End: 2022-04-25

## 2022-04-25 RX ORDER — HYDROCHLOROTHIAZIDE 25 MG/1
25 TABLET ORAL DAILY
Status: DISCONTINUED | OUTPATIENT
Start: 2022-04-25 | End: 2022-04-27 | Stop reason: HOSPADM

## 2022-04-25 RX ORDER — POLYETHYLENE GLYCOL 3350 17 G
2 POWDER IN PACKET (EA) ORAL
Status: DISCONTINUED | OUTPATIENT
Start: 2022-04-25 | End: 2022-04-25

## 2022-04-25 RX ORDER — CARVEDILOL 6.25 MG/1
6.25 TABLET ORAL 2 TIMES DAILY WITH MEALS
Status: DISCONTINUED | OUTPATIENT
Start: 2022-04-25 | End: 2022-04-26

## 2022-04-25 RX ADMIN — METOCLOPRAMIDE 10 MG: 5 INJECTION, SOLUTION INTRAMUSCULAR; INTRAVENOUS at 08:26

## 2022-04-25 RX ADMIN — DIPHENHYDRAMINE HYDROCHLORIDE 25 MG: 50 INJECTION, SOLUTION INTRAMUSCULAR; INTRAVENOUS at 08:26

## 2022-04-25 RX ADMIN — IOPAMIDOL 75 ML: 755 INJECTION, SOLUTION INTRAVENOUS at 09:10

## 2022-04-25 RX ADMIN — IOPAMIDOL 75 ML: 755 INJECTION, SOLUTION INTRAVENOUS at 08:59

## 2022-04-25 RX ADMIN — SODIUM CHLORIDE 1000 ML: 9 INJECTION, SOLUTION INTRAVENOUS at 10:36

## 2022-04-25 RX ADMIN — ATORVASTATIN CALCIUM 40 MG: 40 TABLET, FILM COATED ORAL at 20:47

## 2022-04-25 RX ADMIN — ENOXAPARIN SODIUM 40 MG: 40 INJECTION SUBCUTANEOUS at 13:00

## 2022-04-25 RX ADMIN — Medication 10 ML: at 09:00

## 2022-04-25 RX ADMIN — HYDROCHLOROTHIAZIDE 25 MG: 25 TABLET ORAL at 16:00

## 2022-04-25 RX ADMIN — CARVEDILOL 6.25 MG: 6.25 TABLET, FILM COATED ORAL at 18:27

## 2022-04-25 RX ADMIN — ASPIRIN 325 MG: 325 TABLET ORAL at 08:26

## 2022-04-25 RX ADMIN — SODIUM CHLORIDE, PRESERVATIVE FREE 10 ML: 5 INJECTION INTRAVENOUS at 20:47

## 2022-04-25 ASSESSMENT — PAIN DESCRIPTION - DESCRIPTORS
DESCRIPTORS: DISCOMFORT;PRESSURE
DESCRIPTORS: PRESSURE
DESCRIPTORS: PRESSURE

## 2022-04-25 ASSESSMENT — ENCOUNTER SYMPTOMS
SHORTNESS OF BREATH: 1
DIARRHEA: 0
CHEST TIGHTNESS: 1
NAUSEA: 1
ABDOMINAL PAIN: 0
VOMITING: 0

## 2022-04-25 ASSESSMENT — PAIN DESCRIPTION - PAIN TYPE
TYPE: ACUTE PAIN

## 2022-04-25 ASSESSMENT — PAIN SCALES - GENERAL
PAINLEVEL_OUTOF10: 3
PAINLEVEL_OUTOF10: 2
PAINLEVEL_OUTOF10: 5
PAINLEVEL_OUTOF10: 3

## 2022-04-25 ASSESSMENT — PAIN DESCRIPTION - FREQUENCY
FREQUENCY: CONTINUOUS

## 2022-04-25 ASSESSMENT — PAIN DESCRIPTION - LOCATION
LOCATION: CHEST

## 2022-04-25 ASSESSMENT — PAIN - FUNCTIONAL ASSESSMENT: PAIN_FUNCTIONAL_ASSESSMENT: 0-10

## 2022-04-25 NOTE — ED TRIAGE NOTES
Per pt \"I woke up out of my sleep because my chest was hurting and it was going up the right side of my jaw. Im just having pressure in my chest now with dizzyness. \"

## 2022-04-25 NOTE — ED PROVIDER NOTES
EMERGENCY DEPARTMENT ENCOUNTER    [address] [facility] EMERGENCY DEPARTMENT        TRIAGE CHIEF COMPLAINT:   Chest Pain      [place]:  Carole Murrieta is a 74 y.o. female that presents for chest pain, headache, dizziness. Onset was upon waking this morning at 5 AM.  Context is patient states that she went to bed last night feeling well and awoke this morning with sharp mid retrosternal chest pain radiating to the shoulder blades and into her right jaw. She also had dull throbbing pain to the back of the head with associated dizziness described as \"room spinning. \"  Does state the dizziness has resolved but continues to have a headache and feeling lightheaded. Chest pain is more of a heavy pressure, 5/10 but continues to have a sharp stabbing pain between the shoulder blades. No known alleviating or exacerbating factors. Denying any pleuritic chest pain hemoptysis or previous history of DVT/PE. No recent fall or head injuries. No personal or family history for brain aneurysms. Does state that he had a history of migraines associated with her menstrual cycle but does not typically get headaches other than sinus headaches. Denying neck pain or stiffness, difficulty swallowing or breathing. No facial numbness or tingling or trouble with speech. Denying any numbness tingling or weakness in the upper or lower extremities. Does state that she feels nauseated without associate abdominal pain or urinary symptoms or changes in bowel movements. Patient is never been evaluated by cardiology in the past, no underlying history for coronary artery disease or cardiac stents. Not currently on any anticoagulation therapy. No other fever chills, cough, nasal congestion or URI symptoms.     ROS:  General:  No fevers, no chills   Cardiovascular:  See HPI    Respiratory: See HPI  Gastrointestinal:  No pain, +nausea, no vomiting, no diarrhea  Musculoskeletal:  No muscle pain, no joint pain  Skin:  No rash, no pruritis, no easy bruising  Neurologic:  No speech problems, +headache, no extremity numbness, no extremity tingling, no extremity weakness  Psychiatric:  No anxiety  Genitourinary:  No dysuria, no hematuria  Extremities:  No edema    Past Medical History:   Diagnosis Date    Allergic rhinitis     Obesity      Past Surgical History:   Procedure Laterality Date    HYSTERECTOMY, TOTAL ABDOMINAL      TONSILLECTOMY       Family History   Problem Relation Age of Onset    Cancer Other         aunt    Diabetes Mother     Hypertension Mother     Heart Disease Father     Heart Disease Brother     Heart Disease Other         grandmother    Hypertension Brother     Hypertension Son     Other Brother         chronic lung disease    Other Son         chronic lung disease    Elevated Lipids Brother     Elevated Lipids Son      Social History     Socioeconomic History    Marital status:      Spouse name: Not on file    Number of children: Not on file    Years of education: Not on file    Highest education level: Not on file   Occupational History    Not on file   Tobacco Use    Smoking status: Former Smoker     Packs/day: 0.50     Years: 20.00     Pack years: 10.00     Types: Cigarettes     Quit date: 6/10/1991     Years since quittin.8    Smokeless tobacco: Never Used   Substance and Sexual Activity    Alcohol use: No    Drug use: No    Sexual activity: Not on file   Other Topics Concern    Not on file   Social History Narrative    Not on file     Social Determinants of Health     Financial Resource Strain:     Difficulty of Paying Living Expenses: Not on file   Food Insecurity:     Worried About Running Out of Food in the Last Year: Not on file    Jacky of Food in the Last Year: Not on file   Transportation Needs:     Lack of Transportation (Medical): Not on file    Lack of Transportation (Non-Medical):  Not on file   Physical Activity:     Days of Exercise per Week: Not on file   [email or web address] of Exercise per Session: Not on file   Stress:     Feeling of Stress : Not on file   Social Connections:     Frequency of Communication with Friends and Family: Not on file    Frequency of Social Gatherings with Friends and Family: Not on file    Attends Roman [employer] Services: Not on file    Active Member of [address] [place] [address] or Organizations: Not on file    Attends Club or Organization Meetings: Not on file    Marital Status: Not on file   Intimate Partner Violence:     Fear of Current or Ex-Partner: Not on file    Emotionally Abused: Not on file    Physically Abused: Not on file    Sexually Abused: Not on file   Housing Stability:     Unable to Pay for Housing in the Last Year: Not on file    Number of [place] in the Last Year: Not on file    Unstable Housing in the Last Year: Not on file     Current Facility-Administered Medications   Medication Dose Route Frequency Provider Last Rate Last Admin    0.9 % sodium chloride bolus  1,000 mL IntraVENous Once Adonna Bread, PA-C 1,000 mL/hr at 04/25/22 1036 1,000 mL at 04/25/22 1036     Current Outpatient Medications   Medication Sig Dispense Refill    celecoxib (CELEBREX) 200 MG capsule Take 1 capsule by mouth daily. 30 capsule 5     Allergies   Allergen Reactions    Pcn [Penicillins] Swelling and Rash    Sulfa Antibiotics Rash       Nursing Notes Reviewed  PHYSICAL EXAM    VITAL SIGNS: BP (!) 136/90   Pulse 122   Temp 98 °F (36.7 °C) (Oral)   Resp 19   Ht 5' 6\" (1.676 m)   Wt 185 lb (83.9 kg)   SpO2 98%   BMI 29.86 kg/m²    Constitutional:  Well developed, Well nourished, In no acute distress  Head:  Normocephalic, Atraumatic  Eyes: PERRL. EOMI. no nystagmus. Sclera clear. Conjunctiva normal, No discharge. Neck/Lymphatics: Supple, no JVD, No lymphadenopathy  Cardiovascular: Mildly tachycardic 106 bpm, regular rhythm. Normal S1/S2.     Peripheral Vascular: Distal pulses 2+, Capillary refill <2seconds  Respiratory:  Respirations nonnlabored, speaking full sentences without difficulty. Clear to auscultation bilaterally, No retractions  Abdomen: Bowel sounds normal in all quadrants, Soft, Non tender/Nondistended, No palpable abdominal masses. Musculoskeletal: No edema, No tenderness, No cyanosis, 5/5 strength bilaterally, BUE/BLE symmetrical without atrophy or deformities  Integument:  Warm, Dry, Intact, Skin turgor and texture normal  Neurologic:  Alert & oriented x3 , No focal deficits noted. Cranial nerves II through XII grossly intact. No slurred speech. No facial droop.   No truncal or gait ataxia during exam.    Sarah Sam's NIH Stroke Scale at 8:22 AM is:  Level of Consciousness:  0 - alert; keenly responsive    LOC Questions:  0 - answers both questions correctly    LOC Commands:  0 - performs both tasks correctly    Best Gaze:  0 - normal    Visual Fields:  0 - no visual loss    Facial Palsy:  0 - normal symmetric movement    Motor-Arm-Left:  0 - no drift, limb holds 90 (or 45) degrees for full 10 seconds    Motor-Leg-Left:  0 - no drift; leg holds 30 degree position for full 5 seconds    Motor-Arm-Right:  0 - no drift, limb holds 90 (or 45) degrees for full 10 seconds    Motor-Leg-Right:  0 - no drift; leg holds 30 degree position for full 5 seconds    Limb Ataxia:  0 - absent    Sensory:  0 - normal; no sensory loss    Best Language:  0 - no aphasia, normal    Dysarthria:  0 - normal    Extinction and Inattention:  0 - no abnormality    Psychiatric:  Affect appropriate      I have reviewed and interpreted all of the currently available lab results from this visit (if applicable):  Results for orders placed or performed during the hospital encounter of 04/25/22   CBC with Auto Differential   Result Value Ref Range    WBC 6.8 4.0 - 10.5 K/CU MM    RBC 4.97 4.2 - 5.4 M/CU MM    Hemoglobin 15.4 12.5 - 16.0 GM/DL    Hematocrit 47.6 (H) 37 - 47 %    MCV 95.8 78 - 100 FL    MCH 31.0 27 - 31 PG    MCHC 32.4 32.0 - 36.0 %    RDW 12.9 11.7 - 14.9 %    Platelets 341 217 - 529 K/CU MM    MPV 10.1 7.5 - 11.1 FL    Differential Type AUTOMATED DIFFERENTIAL     Segs Relative 59.2 36 - 66 %    Lymphocytes % 31.4 24 - 44 %    Monocytes % 5.2 (H) 0 - 4 %    Eosinophils % 3.1 (H) 0 - 3 %    Basophils % 0.7 0 - 1 %    Segs Absolute 4.0 K/CU MM    Lymphocytes Absolute 2.1 K/CU MM    Monocytes Absolute 0.4 K/CU MM    Eosinophils Absolute 0.2 K/CU MM    Basophils Absolute 0.1 K/CU MM    Nucleated RBC % 0.0 %    Total Nucleated RBC 0.0 K/CU MM    Total Immature Neutrophil 0.03 K/CU MM    Immature Neutrophil % 0.4 0 - 0.43 %   Comprehensive Metabolic Panel   Result Value Ref Range    Sodium 139 135 - 145 MMOL/L    Potassium 3.5 3.5 - 5.1 MMOL/L    Chloride 103 99 - 110 mMol/L    CO2 23 21 - 32 MMOL/L    BUN 10 6 - 23 MG/DL    CREATININE 0.7 0.6 - 1.1 MG/DL    Glucose 108 (H) 70 - 99 MG/DL    Calcium 9.2 8.3 - 10.6 MG/DL    Albumin 4.4 3.4 - 5.0 GM/DL    Total Protein 7.1 6.4 - 8.2 GM/DL    Total Bilirubin 0.4 0.0 - 1.0 MG/DL    ALT 24 10 - 40 U/L    AST 21 15 - 37 IU/L    Alkaline Phosphatase 113 40 - 129 IU/L    GFR Non-African American >60 >60 mL/min/1.73m2    GFR African American >60 >60 mL/min/1.73m2    Anion Gap 13 4 - 16   Troponin   Result Value Ref Range    Troponin T <0.010 <0.01 NG/ML   Brain Natriuretic Peptide   Result Value Ref Range    Pro-BNP 57.30 <300 PG/ML   Lipase   Result Value Ref Range    Lipase 41 13 - 60 IU/L   EKG 12 Lead   Result Value Ref Range    Ventricular Rate 112 BPM    Atrial Rate 112 BPM    P-R Interval 150 ms    QRS Duration 86 ms    Q-T Interval 358 ms    QTc Calculation (Bazett) 488 ms    P Axis 51 degrees    R Axis -65 degrees    T Axis 52 degrees    Diagnosis       Sinus tachycardia  Possible Left atrial enlargement  Left axis deviation  Inferior infarct (cited on or before 13-DEC-2017)  Anterolateral infarct (cited on or before 13-DEC-2017)  Abnormal ECG  When compared with ECG of 26-FEB-2019 14:00,  QRS axis shifted left          Radiographs (if obtained):  [] The following radiograph was interpreted by myself in the absence of a radiologist:   [] Radiologist's Report Reviewed:  CTA HEAD NECK W CONTRAST   Final Result   Unremarkable CTA of the head and neck. CTA CHEST W CONTRAST   Preliminary Result   No evidence of pulmonary embolism or acute pulmonary abnormality. Minimal   atelectatic changes. No acute intracranial abnormality. CT HEAD WO CONTRAST   Preliminary Result   No evidence of pulmonary embolism or acute pulmonary abnormality. Minimal   atelectatic changes. No acute intracranial abnormality. XR CHEST PORTABLE   Final Result   No acute cardiopulmonary process.                   CTA HEAD NECK W CONTRAST (Final result)  Result time 04/25/22 09:17:51  Final result by Isidoro Collins MD (04/25/22 09:17:51)                Impression:    Unremarkable CTA of the head and neck. Narrative:    EXAMINATION:   CTA OF THE HEAD AND NECK WITH CONTRAST 4/25/2022 9:09 am:     TECHNIQUE:   CTA of the head and neck was performed with the administration of intravenous   contrast. Multiplanar reformatted images are provided for review.  MIP images   are provided for review. Stenosis of the internal carotid arteries measured   using NASCET criteria. Dose modulation, iterative reconstruction, and/or   weight based adjustment of the mA/kV was utilized to reduce the radiation   dose to as low as reasonably achievable. COMPARISON:   CT brain performed 04/25/2022. HISTORY:   ORDERING SYSTEM PROVIDED HISTORY: headache, dizziness improving   TECHNOLOGIST PROVIDED HISTORY:   Reason for exam:->headache, dizziness improving   Has a \"code stroke\" or \"stroke alert\" been called? ->No   Decision Support Exception - unselect if not a suspected or confirmed   emergency medical condition->Emergency Medical Condition ([place])   Reason for Exam: headache, dizziness improving   Additional signs and symptoms: NONE   Relevant Medical/Surgical History: 75ML ISOVUE 370/ GFR>60     FINDINGS:     CTA NECK:     AORTIC ARCH/ARCH VESSELS: No dissection or arterial injury.  No significant   stenosis of the brachiocephalic or subclavian arteries. CAROTID ARTERIES: No dissection, arterial injury, or hemodynamically   significant stenosis by NASCET criteria. VERTEBRAL ARTERIES: No dissection, arterial injury, or significant stenosis. SOFT TISSUES: The lung apices are clear.  No cervical or superior mediastinal   lymphadenopathy.  The larynx and pharynx are unremarkable.  No acute   abnormality of the salivary and thyroid glands. BONES: No acute osseous abnormality. CTA HEAD:     ANTERIOR CIRCULATION: No significant stenosis of the intracranial internal   carotid, anterior cerebral, or middle cerebral arteries. No aneurysm. POSTERIOR CIRCULATION: No significant stenosis of the vertebral, basilar, or   posterior cerebral arteries. No aneurysm. OTHER: No dural venous sinus thrombosis on this non-dedicated study. BRAIN: No mass effect or midline shift. No extra-axial fluid collection. The   gray-white differentiation is maintained.                       CTA CHEST W CONTRAST (Preliminary result)  Result time 04/25/22 09:18:54  Preliminary result by Aldo Flores MD (04/25/22 09:18:54)                Impression:    No evidence of pulmonary embolism or acute pulmonary abnormality.  Minimal   atelectatic changes. No acute intracranial abnormality. Narrative:    EXAMINATION:   CTA OF THE CHEST; CT OF THE HEAD WITHOUT CONTRAST 4/25/2022 7:58 am;   4/25/2022 7:57 am     TECHNIQUE:   CTA of the chest was performed after the administration of intravenous   contrast.  Multiplanar reformatted images are provided for review.  MIP   images are provided for review.  Dose modulation, iterative reconstruction,   and/or weight based adjustment of the mA/kV was utilized to reduce the   radiation dose to as low as reasonably achievable.; CT of the head was   performed without the administration of intravenous contrast. Dose   modulation, iterative reconstruction, and/or weight based adjustment of the   mA/kV was utilized to reduce the radiation dose to as low as reasonably   achievable. COMPARISON:   08/11/2019 and February 15, 2010. HISTORY:   ORDERING SYSTEM PROVIDED HISTORY: Chest pain, SOB, tachycardia. TECHNOLOGIST PROVIDED HISTORY:   Reason for exam:->Chest pain, SOB, tachycardia   Decision Support Exception - unselect if not a suspected or confirmed   emergency medical condition->Emergency Medical Condition ([place])   Reason for Exam: Chest pain, SOB, tachycardia. Additional signs and symptoms: NONE   Relevant Medical/Surgical History: 75ML ISOVUE 370/ GFR>60; ORDERING SYSTEM   PROVIDED HISTORY: head pain   TECHNOLOGIST PROVIDED HISTORY:   Has a \"code stroke\" or \"stroke alert\" been called? ->No   Reason for exam:->Head pain   Decision Support Exception - unselect if not a suspected or confirmed   emergency medical condition->Emergency Medical Condition ([place])   Reason for Exam: HEADACHE   Additional signs and symptoms: NONE   Relevant Medical/Surgical History: NONE     FINDINGS:     CTA CHEST:     Pulmonary Arteries: Pulmonary arteries are adequately opacified for   evaluation. No evidence of intraluminal filling defect to suggest pulmonary   embolism.  Main pulmonary artery is normal in caliber. Mediastinum: No evidence of mediastinal lymphadenopathy.  The heart and   pericardium demonstrate no acute abnormality.  There is no acute abnormality   of the thoracic aorta. Lungs/pleura: The lungs are without acute process.  No focal consolidation or   pulmonary edema.  No evidence of pleural effusion or pneumothorax.  Minimal   atelectatic changes lower lobes. Upper Abdomen: Limited images of the upper abdomen are unremarkable. Soft Tissues/Bones: No acute bone or soft tissue abnormality.      CT HEAD:     No evidence of intra or extra-axial hemorrhage.  No evidence of masses or   mass effects or shifts.  Ventricular system is normal and symmetrical.  The   skull paranasal sinuses and orbits appear unremarkable.                 Preliminary result by Mariely Ortiz MD (04/25/22 09:15:49)                Impression:    No evidence of pulmonary embolism or acute pulmonary abnormality.  Minimal   atelectatic changes. No acute intracranial abnormality.                       CT HEAD WO CONTRAST (Preliminary result)  Result time 04/25/22 09:18:54  Preliminary result by Mariely Ortiz MD (04/25/22 09:18:54)                Impression:    No evidence of pulmonary embolism or acute pulmonary abnormality.  Minimal   atelectatic changes. No acute intracranial abnormality. Narrative:    EXAMINATION:   CTA OF THE CHEST; CT OF THE HEAD WITHOUT CONTRAST 4/25/2022 7:58 am;   4/25/2022 7:57 am     TECHNIQUE:   CTA of the chest was performed after the administration of intravenous   contrast.  Multiplanar reformatted images are provided for review.  MIP   images are provided for review. Dose modulation, iterative reconstruction,   and/or weight based adjustment of the mA/kV was utilized to reduce the   radiation dose to as low as reasonably achievable.; CT of the head was   performed without the administration of intravenous contrast. Dose   modulation, iterative reconstruction, and/or weight based adjustment of the   mA/kV was utilized to reduce the radiation dose to as low as reasonably   achievable. COMPARISON:   08/11/2019 and February 15, 2010. HISTORY:   ORDERING SYSTEM PROVIDED HISTORY: Chest pain, SOB, tachycardia. TECHNOLOGIST PROVIDED HISTORY:   Reason for exam:->Chest pain, SOB, tachycardia   Decision Support Exception - unselect if not a suspected or confirmed   emergency medical condition->Emergency Medical Condition ([place])   Reason for Exam: Chest pain, SOB, tachycardia.    Additional signs and symptoms: NONE   Relevant Medical/Surgical History: 75ML ISOVUE 370/ GFR>60; ORDERING SYSTEM   PROVIDED HISTORY: head pain   TECHNOLOGIST PROVIDED HISTORY:   Has a \"code stroke\" or \"stroke alert\" been called? ->No   Reason for exam:->Head pain   Decision Support Exception - unselect if not a suspected or confirmed   emergency medical condition->Emergency Medical Condition ([place])   Reason for Exam: HEADACHE   Additional signs and symptoms: NONE   Relevant Medical/Surgical History: NONE     FINDINGS:     CTA CHEST:     Pulmonary Arteries: Pulmonary arteries are adequately opacified for   evaluation. No evidence of intraluminal filling defect to suggest pulmonary   embolism.  Main pulmonary artery is normal in caliber. Mediastinum: No evidence of mediastinal lymphadenopathy.  The heart and   pericardium demonstrate no acute abnormality.  There is no acute abnormality   of the thoracic aorta. Lungs/pleura: The lungs are without acute process.  No focal consolidation or   pulmonary edema.  No evidence of pleural effusion or pneumothorax.  Minimal   atelectatic changes lower lobes. Upper Abdomen: Limited images of the upper abdomen are unremarkable. Soft Tissues/Bones: No acute bone or soft tissue abnormality. CT HEAD:     No evidence of intra or extra-axial hemorrhage.  No evidence of masses or   mass effects or shifts.  Ventricular system is normal and symmetrical.  The   skull paranasal sinuses and orbits appear unremarkable.                 Preliminary result by Jackelin Coffey MD (04/25/22 09:15:49)                Impression:    No evidence of pulmonary embolism or acute pulmonary abnormality.  Minimal   atelectatic changes. No acute intracranial abnormality.                       XR CHEST PORTABLE (Final result)  Result time 04/25/22 08:13:56  Final result by Carl Bennett MD (04/25/22 08:13:56)                Impression:    No acute cardiopulmonary process.              Narrative: EXAMINATION:   ONE XRAY VIEW OF THE CHEST     4/25/2022 7:33 am     COMPARISON:   December 13, 2017     HISTORY:   ORDERING SYSTEM PROVIDED HISTORY: Chest pain. TECHNOLOGIST PROVIDED HISTORY:   Reason for exam:->chest pain   Reason for Exam: Chest pain. FINDINGS:   The cardiomediastinal silhouette is normal in size. The lungs are clear. No pleural effusion or pneumothorax is present.                     EKG Interpretation  Please see ED physician's note - Dr. Yury Thomas - for EKG interpretation      Chart review shows recent radiographs:  XR CHEST PORTABLE    Result Date: 4/25/2022  EXAMINATION: ONE XRAY VIEW OF THE CHEST 4/25/2022 7:33 am COMPARISON: December 13, 2017 HISTORY: ORDERING SYSTEM PROVIDED HISTORY: Chest pain. TECHNOLOGIST PROVIDED HISTORY: Reason for exam:->chest pain Reason for Exam: Chest pain. FINDINGS: The cardiomediastinal silhouette is normal in size. The lungs are clear. No pleural effusion or pneumothorax is present. No acute cardiopulmonary process. ED COURSE & MEDICAL DECISION MAKING       Vital signs and nursing notes reviewed during ED course. I have independently evaluated this patient . Supervising physician - Dr. Yury Thomas - was present in ED and available for consultation throughout entirety of patient's care. All pertinent Lab data and radiographic results reviewed with patient at bedside. The patient and/or the family were informed of the results of any tests/labs/imaging, the treatment plan, and time was allotted to answer questions. Clinical Impression:  1. Chest pain, unspecified type    2. Acute nonintractable headache, unspecified headache type    3. Lightheadedness    4. Tachycardia        Patient presents with chest pain, headache and jaw pain. My exam, patient is awake and alert x3, nonfocal neurological exam.  No nuchal rigidity. She is moving all extremities with equal tone coordination and strength. No pronator drift.   Unremarkable cardiopulmonary exam other than mildly tachycardic. 98% on room air without increased work of breathing. Abdominal exam is nonsurgical.  As patient does endorse a headache without current dizziness and otherwise has a negative NIH score, stroke alert was not initiated however did order CT/CTA head and neck imaging initiated to CTA chest imaging given her chest pain tachycardic rate. Did place her into telemetry continuous pulse ox monitoring, IV fluids, Benadryl/Reglan as well as oral aspirin. Labs are reassuring. Normal white count hemoglobin. CMP without significant derangement, normal renal function. Troponin and BNP within normal range. EKG shows sinus tachycardia without other acute ischemic features. Lipase is normal.  Chest x-ray shows no evidence of acute cardiopulmonary process. CT head and CTA head and neck imaging with contrast shows no evidence of acute intracranial process or significant stenosis. CTA chest with contrast also negative for evidence of PE or other acute pulmonary abnormality but there are minimal atelectatic changes. On reassessment, patient has had resolution of headache, had no return of dizziness while in the ED. However given with patient's chest pain, age and presenting symptoms, I do think she would benefit from admission for ACS/chest pain rule out. Patient is comfortable and agreeable this plan. Given her tachycardia, did add on additional thyroid labs and magnesium. I did consult with SUSAN Xavier PA-C  - hospitalist - and discussed patient's history, ED presentation/course including any pertinent laboratory findings and imaging study findings. He/she agrees to hospital admission. Patient is admitted to the hospital in stable condition. In consideration of current COVID19 pandemic, with effort to minimize unnecessary provider exposure, this patient was seen at bedside by me independently.  However, in compliance with current hospital SESAR/ED protocol, prior to admission I did discuss this patient case with emergency department physician, Dr. Krishan Puente, who did agree with ED workup/evaluation and plan for admission however but ED attending physician did not independently evaluate the patient. Of note, this Pt was NOT admitted to the ICU. Diagnosis and plan discussed in detail with patient who understands and agrees. Disposition referral (if applicable):  No follow-up provider specified.     Disposition medications (if applicable):  New Prescriptions    No medications on file         (Please note that portions of this note may have been completed with a voice recognition program. Efforts were made to edit the dictations but occasionally words are mis-transcribed.)              Sharolyn Felty, PA-C  04/25/22 [id number]

## 2022-04-25 NOTE — CONSULTS
[facility] PHYSICAL [facility]  Kristal Robledo  Phone: [phone]    Fax [phone]                  Rober Banks MD, Mary Ann Amezcua MD, [facility], MD, MD Grisel Nevarez MD Oswald Sheriff, MD Margaree Bang, MD Israel Juneau, ROBYN Fofana, APRSARMAD Liu, APRSARMAD Carranza, APRSARMAD Falcon PA-C    CARDIOLOGY CONSULT NOTE     Reason for consultation:  Chest pain    Referring physician:  Garo Pierre DO     Primary care physician: No primary care provider on file. Thank you for the consult. Chief Complaints :  Chief Complaint   Patient presents with    Chest Pain        History of present Aliza Rangel is a 74 y. o.year old  female without a significant past medical history. Patient presents with chest pain that began this morning around 4 AM.    Patient stated that she began to experience chest pain that woke her up out of her sleep. Associated with this chest pain was right-sided jaw pain and the pain radiated through to her back. She stated that when it first began it was growing in intensity and it was crampy in nature. She decided to head to the emergency department and drove herself. Pain lasted approximately 1 to 2 hours and improved following administration of Reglan and aspirin. She is now only endorsing chest pressure in the center of her chest.  Patient also endorsed nausea and headache along with the chest pain. While in the emergency department her blood pressure has been consistently in the 160s or higher. Was not previously hypertensive. She does not have any prior cardiac history but has had a recent COVID-19 infection in January 2022. She has not felt the same since that diagnosis. She has had increasing shortness of breath with exertion since January. No prior cardiac work-up.     Troponin negative x2, BNP of 57.3, potassium 3.5, sodium of 139, magnesium 2.1, hemoglobin of 15.4, WBC of 6.8    Past medical history:    has a past medical history of Allergic rhinitis and Obesity. Past surgical history:   has a past surgical history that includes Hysterectomy, total abdominal and Tonsillectomy. Social History:   reports that she quit smoking about 30 years ago. Her smoking use included cigarettes. She has a 10.00 pack-year smoking history. She has never used smokeless tobacco. She reports that she does not drink alcohol and does not use drugs. Family history:   Mother was a diabetic and father had coronary artery disease    Allergies   Allergen Reactions    Pcn [Penicillins] Swelling and Rash    Sulfa Antibiotics Rash       sodium chloride flush 0.9 % injection 10 mL, 2 times per day  sodium chloride flush 0.9 % injection 10 mL, PRN  0.9 % sodium chloride infusion, PRN  nicotine (NICODERM CQ) 14 MG/24HR 1 patch, Daily  nicotine polacrilex (COMMIT) lozenge 2 mg, Q1H PRN  promethazine (PHENERGAN) tablet 12.5 mg, Q6H PRN   Or  ondansetron (ZOFRAN) injection 4 mg, Q6H PRN  acetaminophen (TYLENOL) tablet 650 mg, Q6H PRN   Or  acetaminophen (TYLENOL) suppository 650 mg, Q6H PRN  magnesium hydroxide (MILK OF MAGNESIA) 400 MG/5ML suspension 30 mL, Daily PRN  [START ON 4/26/2022] aspirin chewable tablet 81 mg, Daily  enoxaparin (LOVENOX) injection 40 mg, Daily  nitroGLYCERIN (NITROSTAT) SL tablet 0.4 mg, Q5 Min PRN  atorvastatin (LIPITOR) tablet 40 mg, Nightly      Current Facility-Administered Medications   Medication Dose Route Frequency Provider Last Rate Last Admin    sodium chloride flush 0.9 % injection 10 mL  10 mL IntraVENous 2 times per day Kellie Wallace PA-C        sodium chloride flush 0.9 % injection 10 mL  10 mL IntraVENous PRN Kellie Wallace PA-C        0.9 % sodium chloride infusion   IntraVENous PRN Kellie Wallace PA-C        nicotine (NICODERM CQ) 14 MG/24HR 1 patch  1 patch TransDERmal Daily Perla Homans, PA-C        nicotine polacrilex (COMMIT) lozenge 2 mg  2 mg Oral Q1H PRN Kellie Wallace PA-C        promethazine (PHENERGAN) tablet 12.5 mg  12.5 mg Oral Q6H PRN Kellie Wallace PA-C        Or    ondansetron [facility]) injection 4 mg  4 mg IntraVENous Q6H PRN Kellie Wallace PA-C        acetaminophen (TYLENOL) tablet 650 mg  650 mg Oral Q6H PRN Kellie Wallace PA-C        Or    acetaminophen (TYLENOL) suppository 650 mg  650 mg Rectal Q6H PRN Fely Guillory PA-C        magnesium hydroxide (MILK OF MAGNESIA) 400 MG/5ML suspension 30 mL  30 mL Oral Daily PRN Kellie Wallace PA-C        [START ON 4/26/2022] aspirin chewable tablet 81 mg  81 mg Oral Daily Kellie Wallace PA-C        enoxaparin (LOVENOX) injection 40 mg  40 mg SubCUTAneous Daily Kellie Wallace PA-C        nitroGLYCERIN (NITROSTAT) SL tablet 0.4 mg  0.4 mg SubLINGual Q5 Min PRN Kellie Wallace PA-C        atorvastatin (LIPITOR) tablet 40 mg  40 mg Oral Nightly Fely Guillory PA-C           Review of Systems   Constitutional: Negative for diaphoresis, fatigue and fever. Eyes: Negative for visual disturbance. Respiratory: Positive for chest tightness and shortness of breath. Cardiovascular: Positive for chest pain. Negative for palpitations and leg swelling. Gastrointestinal: Positive for nausea. Negative for abdominal pain, diarrhea and vomiting. Endocrine: Negative for cold intolerance and heat intolerance. Musculoskeletal: Negative for arthralgias. Skin: Negative for pallor and rash. Neurological: Positive for headaches. Negative for dizziness, syncope and light-headedness. Psychiatric/Behavioral: Negative for dysphoric mood. The patient is not nervous/anxious.             Physical Examination:    Vitals:    04/25/22 1030 04/25/22 1100 04/25/22 1130 04/25/22 1227   BP: (!) 161/102 (!) 171/102 (!) 162/96 (!) 165/98   Pulse: 109 109 105 102   Resp: 16 20 21 17   Temp:    97.9 °F (36.6 °C)   TempSrc:    Oral   SpO2: 98% 93% 95% 94%   Weight:       Height: Physical Exam  Constitutional:       General: She is not in acute distress. Appearance: She is not diaphoretic. HENT:      Head: Normocephalic and atraumatic. Right Ear: External ear normal.      Left Ear: External ear normal.      Nose: Nose normal.      Mouth/Throat:      Mouth: Mucous membranes are moist.   Eyes:      Extraocular Movements: Extraocular movements intact. Comments: Pupils equal and round   Neck:      Vascular: No carotid bruit. Cardiovascular:      Rate and Rhythm: Normal rate and regular rhythm. Pulses: Normal pulses. Heart sounds: S1 normal and S2 normal. No murmur heard. No friction rub. No gallop. Pulmonary:      Effort: Pulmonary effort is normal. No respiratory distress. Breath sounds: Normal breath sounds. No rales. Chest:      Chest wall: No tenderness. Abdominal:      Palpations: Abdomen is soft. Tenderness: There is no abdominal tenderness. Musculoskeletal:      Right lower leg: No edema. Left lower leg: No edema. Skin:     General: Skin is warm and dry. Capillary Refill: Capillary refill takes less than 2 seconds. Findings: No rash. Comments: Skin turgor brisk   Neurological:      Mental Status: She is alert and oriented to person, place, and time. Mental status is at baseline. Psychiatric:         Mood and Affect: Mood normal.         Behavior: Behavior is cooperative. Thought Content:  Thought content normal.         Judgment: Judgment normal.            Medical decision making and Data review:    Lab Review   Recent Labs     04/25/22  0646   WBC 6.8   HGB 15.4   HCT 47.6*         Recent Labs     04/25/22  0646      K 3.5      CO2 23   BUN 10   CREATININE 0.7     Recent Labs     04/25/22  0646   AST 21   ALT 24   BILITOT 0.4   ALKPHOS 113     Recent Labs     04/25/22  0646 04/25/22  1040   TROPONINT <0.010 <0.010       Recent Labs     04/25/22  0646   PROBNP 57.30     Lab Results Component Value Date    INR 0.96 11/14/2010    PROTIME 10.4 11/14/2010       EKG: Reviewed by me  Sinus tachycardia no acute signs of ischemia    ECHO: No previous echo    Chest Xray:  No acute cardiopulmonary process    CT HEAD WO CONTRAST; 04/25/2022    No evidence of pulmonary embolism or acute pulmonary abnormality. Minimal atelectatic changes. No acute intracranial abnormality. CTA CHEST W CONTRAST: 04/25/2022    No evidence of pulmonary embolism or acute pulmonary abnormality. Minimal atelectatic changes. No acute intracranial abnormality. CTA HEAD NECK W CONTRAST: 4/25/2022  Unremarkable CTA of the head and neck. All labs, medications and tests reviewed by myself including data  from outside source , patient and available family . Continue all other medications of all above medical condition listed as is. Impression:  Principal Problem:    Chest pain  Active Problems:    Dizziness    Headache  Resolved Problems:    * No resolved hospital problems. *      Assessment and plan: 74 y. o.year old with   1. Chest pain without prior history of CAD  -Troponin negative x2, EKG nonischemic. Will check echo and stress test.  Continue aspirin and Lipitor 40 mg  2. Elevated blood pressure  -Most recent blood pressure of 171/102. Previously on antihypertensives. will start hydrochlorothiazide 25 mg daily  3. Headache  -Per primary care. MRI brain pending. 4. Recent COVID-19 infection  -January 2022. Patient has not felt the same since. More winded than before. 5. DVT prophylaxis  -On Lovenox continue unless contraindicated        Thank you  much for consult and giving us the opportunity in contributing in the care of this patient. Please feel free to call me for any questions.        Siomara Tang PA-C, 4/25/2022 3:32 PM

## 2022-04-25 NOTE — ED PROVIDER NOTES
Twelve-lead EKG obtained at 0641 on 25 April 2022 and interpreted by me in the absence of a cardiologist.  There is no criteria ST elevation or reciprocal change. There are no hyperacute T wave changes. There is no sign of acute ischemia or infarction. This tracing shows a sinus tachycardia with inferior Q waves. Rate and intervals are 112 beats per minute, WI interval 150 milliseconds, QRS duration 86 milliseconds, QTc interval 488 milliseconds, and R axis left shifted at -65 degrees. There is no acute change compared with the most recent EKG dated February 26, 2019.         Colin Reddy MD  04/25/22 7892

## 2022-04-25 NOTE — H&P
History and Physical      Name:  Carlin Link /Age/Sex: 1955  (74 y.o. female)   MRN & CSN:  [id number] & [id number] Encounter Date/Time: 2022 10:14 AM EDT   Location:  ED27/ED-27 PCP: No primary care provider on file. Hospital Day: 1    Assessment and Plan:     Carlin Link is a 74 y.o. female who presents with chest pain and dizziness. Medical decision making:   Chest pain r/o ACS   Sinus tachycardia  o No prior cardiac history. CTA PE negative. CXR negative. EKG with sinus tachycardia. Given 1 L NaCl in ER. TSH normal.   o Initial troponin negative, trend  o Tele monitoring  o Cardiology consulted  o Echo pending, no previous echo  o Nitroglycerin sublingual PRN     Headache and dizziness     o Symptom onset yesterday,  in afternoon, resolved spontaneously and returned this AM. Resolved at this time. CT head/CTA head and neck in ER negative. NIH 0. No focal neurological deficit on examination. o MRI of brain pending  o Echo pending  o Hemoglobin A1c pending  o Neuro consult if abnormalities found on MRI  o Start aspirin and statin  o Neurochecks  o Telemetry monitoring    Other chronic conditions: home medications continued unless contraindicated - not on home medications  History of COVID 19 infection (2022) - was not hospitalized    Disposition:   Current Living situation: home with   Expected Disposition: same  Estimated D/C:     Diet regular   DVT Prophylaxis [x] Lovenox, []  Heparin, [] SCDs, [] Ambulation,  [] Eliquis, [] Xarelto   Code Status full   Surrogate Decision Maker/ POA spouse     History from:   Patient and EMR   History of Present Illness:   Chief Complaint: chest pain  Carlin Link is a 74 y.o. female with pmh of COVID 19, allergies, who presents to the ER for evaluation of chest pain that began this morning as well as headache and dizziness.   Patient states that the dizziness began yesterday while walking at [facility] with associated clamminess and sweating. She had no chest pain associated with the dizziness. She denies any recent medication changes, has normal diet. She denies any new stressors. No focal deficits, slurred speech, motor or sensory changes associated with the dizziness. Patient woke up this morning with chest pain that was sharp and radiating into her right neck with associated dizziness and posterior headache. No vision changes, tinnitus, motor or sensory changes. Patient does not typically get headaches. No prior cardiac work-up. No previous history of CVA, diabetes, lung or cardiac disease. No recent travel, DVTs, surgeries, no lower extremity swelling. No nicotine use or other drug use. At this time, symptoms have resolved. Patient was found to have negative work-up in the ER. Patient's past medical, social, and family history have been reviewed. Patient case discussed with ED provider [facility]  Review of Systems:    10 point system reviewed, negative, unless as noted in above HPI. Objective:   No intake or output data in the 24 hours ending 04/25/22 1014   Vitals:   Vitals:    04/25/22 0700 04/25/22 0703 04/25/22 0900 04/25/22 1000   BP:  (!) 163/94 (!) 151/94 (!) 136/90   Pulse: 106 105 107 122   Resp: 18 22 18 19   Temp: 98 °F (36.7 °C)      TempSrc: Oral      SpO2: 99% 98% 93% 98%   Weight: 185 lb (83.9 kg)      Height: 5' 6\" (1.676 m)        Medications Prior to Admission     Prior to Admission medications    Medication Sig Start Date End Date Taking? Authorizing Provider   dicyclomine (BENTYL) 10 MG capsule Take 1 capsule by mouth every 6 hours as needed (cramps) 8/11/19   Edson Ricks MD   metoprolol succinate (TOPROL XL) 25 MG extended release tablet Take 1 tablet by mouth daily 2/26/19   Lee Torres MD   naproxen (NAPROSYN) 500 MG tablet Take 1 tablet by mouth 2 times daily as needed for Pain 1/28/18   Parvez Rincon PA-C   celecoxib (CELEBREX) 200 MG capsule Take 1 capsule by mouth daily.  6/27/12 6/27/13  Jaguar Dillon MD     Physical Exam:      GEN -Awake. NAD. Appears given age. EYES -PERRLA. No scleral erythema, discharge, or conjunctivitis. HENT -MM are moist. Oral pharynx without exudates, no evidence of thrush. NECK -Supple, no apparent thyromegaly or masses. RESP -CTA, no wheezes, rales or rhonchi. Symmetric chest movement while on room air. C/V -S1/S2 auscultated. RRR without appreciable M/R/G. No JVD or carotid bruits. Peripheral pulses equal bilaterally and palpable. Cap refill <3 sec. No peripheral edema. GI -Abdomen is soft non distended and without significant tenderness to palpation. + BS. No masses or guarding.  -No CVA/ flank tenderness. Pandey catheter is not present. LYMPH-No palpable cervical lymphadenopathy and no hepatosplenomegaly. No petechiae or ecchymoses. MS -No gross joint deformities. SKIN -Normal coloration, warm, dry. NEUROLOGIC EXAM:     Mental Status: A&O to self, location, month and year, NAD, speech clear, language fluent, repetition and naming intact, follows commands appropriately     Cranial Nerve Exam:   CN II-XII:  PERRL, no nystagmus, no gaze paresis, sensation V1-V3 intact b/l, muscles of facial expression symmetric; hearing intact to conversational tone, palate elevates symmetrically, shoulder elevation symmetric and tongue protrudes midline with movement side to side. Motor Exam:       Strength 5/5 UE's/LE's b/l  Tone normal   No pronator drift     Sensation: Intact light touch UE's/LE's b/l     Coordination/Cerebellum:       Tremors--none      Rapidly alternating movements: normal bilaterally      Heel-to-Shin: no dysmetria b/l      Finger-to-Nose: no dysmetria b/l     Gait and stance:      Gait: deferred for safety       Past Medical History:   PMHx   Past Medical History:   Diagnosis Date    Allergic rhinitis     Obesity      PSHX:  has a past surgical history that includes Hysterectomy, total abdominal and Tonsillectomy.   Allergies: Allergies   Allergen Reactions    Pcn [Penicillins] Swelling and Rash    Sulfa Antibiotics Rash     Fam HX: family history includes Cancer in an other family member; Diabetes in her mother; Elevated Lipids in her brother and son; Heart Disease in her brother, father, and another family member; Hypertension in her brother, mother, and son; Other in her brother and son. Soc HX:   Social History     Socioeconomic History    Marital status:      Spouse name: None    Number of children: None    Years of education: None    Highest education level: None   Occupational History    None   Tobacco Use    Smoking status: Former Smoker     Packs/day: 0.50     Years: 20.00     Pack years: 10.00     Types: Cigarettes     Quit date: 6/10/1991     Years since quittin.8    Smokeless tobacco: Never Used   Substance and Sexual Activity    Alcohol use: No    Drug use: No    Sexual activity: None   Other Topics Concern    None   Social History Narrative    None     Social Determinants of Health     Financial Resource Strain:     Difficulty of Paying Living Expenses: Not on file   Food Insecurity:     Worried About Running Out of Food in the Last Year: Not on file    Jacky of Food in the Last Year: Not on file   Transportation Needs:     Lack of Transportation (Medical): Not on file    Lack of Transportation (Non-Medical):  Not on file   Physical Activity:     Days of Exercise per Week: Not on file    Minutes of Exercise per Session: Not on file   Stress:     Feeling of Stress : Not on file   Social Connections:     Frequency of Communication with Friends and Family: Not on file    Frequency of Social Gatherings with Friends and Family: Not on file    Attends Christian Services: Not on file    Active Member of Clubs or Organizations: Not on file    Attends Club or Organization Meetings: Not on file    Marital Status: Not on file   Intimate Partner Violence:     Fear of Current or Ex-Partner: Not on file    Emotionally Abused: Not on file    Physically Abused: Not on file    Sexually Abused: Not on file   Housing Stability:     Unable to Pay for Housing in the Last Year: Not on file    Number of Places Lived in the Last Year: Not on file    Unstable Housing in the Last Year: Not on file     Medications:   Medications:    sodium chloride  1,000 mL IntraVENous Once      Infusions:   PRN Meds:    Labs    CT HEAD WO CONTRAST    Result Date: 4/25/2022  EXAMINATION: CTA OF THE CHEST; CT OF THE HEAD WITHOUT CONTRAST 4/25/2022 7:58 am; 4/25/2022 7:57 am TECHNIQUE: CTA of the chest was performed after the administration of intravenous contrast.  Multiplanar reformatted images are provided for review. MIP images are provided for review. Dose modulation, iterative reconstruction, and/or weight based adjustment of the mA/kV was utilized to reduce the radiation dose to as low as reasonably achievable.; CT of the head was performed without the administration of intravenous contrast. Dose modulation, iterative reconstruction, and/or weight based adjustment of the mA/kV was utilized to reduce the radiation dose to as low as reasonably achievable. COMPARISON: 08/11/2019 and February 15, 2010. HISTORY: ORDERING SYSTEM PROVIDED HISTORY: Chest pain, SOB, tachycardia. TECHNOLOGIST PROVIDED HISTORY: Reason for exam:->Chest pain, SOB, tachycardia Decision Support Exception - unselect if not a suspected or confirmed emergency medical condition->Emergency Medical Condition (MA) Reason for Exam: Chest pain, SOB, tachycardia. Additional signs and symptoms: NONE Relevant Medical/Surgical History: 75ML ISOVUE 370/ GFR>60; ORDERING SYSTEM PROVIDED HISTORY: head pain TECHNOLOGIST PROVIDED HISTORY: Has a \"code stroke\" or \"stroke alert\" been called? ->No Reason for exam:->Head pain Decision Support Exception - unselect if not a suspected or confirmed emergency medical condition->Emergency Medical Condition (MA) Reason for Exam: HEADACHE Additional signs and symptoms: NONE Relevant Medical/Surgical History: NONE FINDINGS: CTA CHEST: Pulmonary Arteries: Pulmonary arteries are adequately opacified for evaluation. No evidence of intraluminal filling defect to suggest pulmonary embolism. Main pulmonary artery is normal in caliber. Mediastinum: No evidence of mediastinal lymphadenopathy. The heart and pericardium demonstrate no acute abnormality. There is no acute abnormality of the thoracic aorta. Lungs/pleura: The lungs are without acute process. No focal consolidation or pulmonary edema. No evidence of pleural effusion or pneumothorax. Minimal atelectatic changes lower lobes. Upper Abdomen: Limited images of the upper abdomen are unremarkable. Soft Tissues/Bones: No acute bone or soft tissue abnormality. CT HEAD: No evidence of intra or extra-axial hemorrhage. No evidence of masses or mass effects or shifts. Ventricular system is normal and symmetrical.  The skull paranasal sinuses and orbits appear unremarkable. No evidence of pulmonary embolism or acute pulmonary abnormality. Minimal atelectatic changes. No acute intracranial abnormality. XR CHEST PORTABLE    Result Date: 4/25/2022  EXAMINATION: ONE XRAY VIEW OF THE CHEST 4/25/2022 7:33 am COMPARISON: December 13, 2017 HISTORY: ORDERING SYSTEM PROVIDED HISTORY: Chest pain. TECHNOLOGIST PROVIDED HISTORY: Reason for exam:->chest pain Reason for Exam: Chest pain. FINDINGS: The cardiomediastinal silhouette is normal in size. The lungs are clear. No pleural effusion or pneumothorax is present. No acute cardiopulmonary process. CTA CHEST W CONTRAST    Result Date: 4/25/2022  EXAMINATION: CTA OF THE CHEST; CT OF THE HEAD WITHOUT CONTRAST 4/25/2022 7:58 am; 4/25/2022 7:57 am TECHNIQUE: CTA of the chest was performed after the administration of intravenous contrast.  Multiplanar reformatted images are provided for review. MIP images are provided for review.  Dose modulation, iterative reconstruction, and/or weight based adjustment of the mA/kV was utilized to reduce the radiation dose to as low as reasonably achievable.; CT of the head was performed without the administration of intravenous contrast. Dose modulation, iterative reconstruction, and/or weight based adjustment of the mA/kV was utilized to reduce the radiation dose to as low as reasonably achievable. COMPARISON: 08/11/2019 and February 15, 2010. HISTORY: ORDERING SYSTEM PROVIDED HISTORY: Chest pain, SOB, tachycardia. TECHNOLOGIST PROVIDED HISTORY: Reason for exam:->Chest pain, SOB, tachycardia Decision Support Exception - unselect if not a suspected or confirmed emergency medical condition->Emergency Medical Condition (MA) Reason for Exam: Chest pain, SOB, tachycardia. Additional signs and symptoms: NONE Relevant Medical/Surgical History: 75ML ISOVUE 370/ GFR>60; ORDERING SYSTEM PROVIDED HISTORY: head pain TECHNOLOGIST PROVIDED HISTORY: Has a \"code stroke\" or \"stroke alert\" been called? ->No Reason for exam:->Head pain Decision Support Exception - unselect if not a suspected or confirmed emergency medical condition->Emergency Medical Condition (MA) Reason for Exam: HEADACHE Additional signs and symptoms: NONE Relevant Medical/Surgical History: NONE FINDINGS: CTA CHEST: Pulmonary Arteries: Pulmonary arteries are adequately opacified for evaluation. No evidence of intraluminal filling defect to suggest pulmonary embolism. Main pulmonary artery is normal in caliber. Mediastinum: No evidence of mediastinal lymphadenopathy. The heart and pericardium demonstrate no acute abnormality. There is no acute abnormality of the thoracic aorta. Lungs/pleura: The lungs are without acute process. No focal consolidation or pulmonary edema. No evidence of pleural effusion or pneumothorax. Minimal atelectatic changes lower lobes. Upper Abdomen: Limited images of the upper abdomen are unremarkable.  Soft Tissues/Bones: No acute bone or soft tissue abnormality. CT HEAD: No evidence of intra or extra-axial hemorrhage. No evidence of masses or mass effects or shifts. Ventricular system is normal and symmetrical.  The skull paranasal sinuses and orbits appear unremarkable. No evidence of pulmonary embolism or acute pulmonary abnormality. Minimal atelectatic changes. No acute intracranial abnormality. CTA HEAD NECK W CONTRAST    Result Date: 4/25/2022  EXAMINATION: CTA OF THE HEAD AND NECK WITH CONTRAST 4/25/2022 9:09 am: TECHNIQUE: CTA of the head and neck was performed with the administration of intravenous contrast. Multiplanar reformatted images are provided for review. MIP images are provided for review. Stenosis of the internal carotid arteries measured using NASCET criteria. Dose modulation, iterative reconstruction, and/or weight based adjustment of the mA/kV was utilized to reduce the radiation dose to as low as reasonably achievable. COMPARISON: CT brain performed 04/25/2022. HISTORY: ORDERING SYSTEM PROVIDED HISTORY: headache, dizziness improving TECHNOLOGIST PROVIDED HISTORY: Reason for exam:->headache, dizziness improving Has a \"code stroke\" or \"stroke alert\" been called? ->No Decision Support Exception - unselect if not a suspected or confirmed emergency medical condition->Emergency Medical Condition (MA) Reason for Exam: headache, dizziness improving Additional signs and symptoms: NONE Relevant Medical/Surgical History: 75ML ISOVUE 370/ GFR>60 FINDINGS: CTA NECK: AORTIC ARCH/ARCH VESSELS: No dissection or arterial injury. No significant stenosis of the brachiocephalic or subclavian arteries. CAROTID ARTERIES: No dissection, arterial injury, or hemodynamically significant stenosis by NASCET criteria. VERTEBRAL ARTERIES: No dissection, arterial injury, or significant stenosis. SOFT TISSUES: The lung apices are clear. No cervical or superior mediastinal lymphadenopathy. The larynx and pharynx are unremarkable.   No acute abnormality of the salivary and thyroid glands. BONES: No acute osseous abnormality. CTA HEAD: ANTERIOR CIRCULATION: No significant stenosis of the intracranial internal carotid, anterior cerebral, or middle cerebral arteries. No aneurysm. POSTERIOR CIRCULATION: No significant stenosis of the vertebral, basilar, or posterior cerebral arteries. No aneurysm. OTHER: No dural venous sinus thrombosis on this non-dedicated study. BRAIN: No mass effect or midline shift. No extra-axial fluid collection. The gray-white differentiation is maintained. Unremarkable CTA of the head and neck. CBC:   Recent Labs     04/25/22 0646   WBC 6.8   HGB 15.4        BMP:    Recent Labs     04/25/22 0646      K 3.5      CO2 23   BUN 10   CREATININE 0.7   GLUCOSE 108*     Hepatic:   Recent Labs     04/25/22 0646   AST 21   ALT 24   BILITOT 0.4   ALKPHOS 113     Lipids: No results found for: CHOL, HDL, TRIG  Hemoglobin A1C: No results found for: LABA1C  TSH: No results found for: TSH  Troponin:   Lab Results   Component Value Date    TROPONINT <0.010 04/25/2022    TROPONINT <0.010 12/13/2017     Lactic Acid: No results for input(s): LACTA in the last 72 hours.   BNP:   Recent Labs     04/25/22 0646   PROBNP 57.30     UA:  Lab Results   Component Value Date    NITRU NEGATIVE 08/11/2019    COLORU STRAW 08/11/2019    WBCUA <1 08/11/2019    RBCUA NONE SEEN 08/11/2019    MUCUS NEGATIVE 11/14/2010    TRICHOMONAS NONE SEEN 08/11/2019    BACTERIA NEGATIVE 08/11/2019    CLARITYU CLEAR 08/11/2019    SPECGRAV 1.006 08/11/2019    LEUKOCYTESUR NEGATIVE 08/11/2019    UROBILINOGEN NORMAL 08/11/2019    BILIRUBINUR NEGATIVE 08/11/2019    BLOODU NEGATIVE 08/11/2019    KETUA NEGATIVE 08/11/2019     Urine Cultures: No results found for: LABURIN  Blood Cultures: No results found for: BC  No results found for: BLOODCULT2  Organism: No results found for: ORG  Imaging/Diagnostics Last 24 Hours   CT HEAD WO CONTRAST    Result Date: 4/25/2022  EXAMINATION: CTA OF THE CHEST; CT OF THE HEAD WITHOUT CONTRAST 4/25/2022 7:58 am; 4/25/2022 7:57 am TECHNIQUE: CTA of the chest was performed after the administration of intravenous contrast.  Multiplanar reformatted images are provided for review. MIP images are provided for review. Dose modulation, iterative reconstruction, and/or weight based adjustment of the mA/kV was utilized to reduce the radiation dose to as low as reasonably achievable.; CT of the head was performed without the administration of intravenous contrast. Dose modulation, iterative reconstruction, and/or weight based adjustment of the mA/kV was utilized to reduce the radiation dose to as low as reasonably achievable. COMPARISON: 08/11/2019 and February 15, 2010. HISTORY: ORDERING SYSTEM PROVIDED HISTORY: Chest pain, SOB, tachycardia. TECHNOLOGIST PROVIDED HISTORY: Reason for exam:->Chest pain, SOB, tachycardia Decision Support Exception - unselect if not a suspected or confirmed emergency medical condition->Emergency Medical Condition (MA) Reason for Exam: Chest pain, SOB, tachycardia. Additional signs and symptoms: NONE Relevant Medical/Surgical History: 75ML ISOVUE 370/ GFR>60; ORDERING SYSTEM PROVIDED HISTORY: head pain TECHNOLOGIST PROVIDED HISTORY: Has a \"code stroke\" or \"stroke alert\" been called? ->No Reason for exam:->Head pain Decision Support Exception - unselect if not a suspected or confirmed emergency medical condition->Emergency Medical Condition (MA) Reason for Exam: HEADACHE Additional signs and symptoms: NONE Relevant Medical/Surgical History: NONE FINDINGS: CTA CHEST: Pulmonary Arteries: Pulmonary arteries are adequately opacified for evaluation. No evidence of intraluminal filling defect to suggest pulmonary embolism. Main pulmonary artery is normal in caliber. Mediastinum: No evidence of mediastinal lymphadenopathy. The heart and pericardium demonstrate no acute abnormality.   There is no acute abnormality of the thoracic aorta. Lungs/pleura: The lungs are without acute process. No focal consolidation or pulmonary edema. No evidence of pleural effusion or pneumothorax. Minimal atelectatic changes lower lobes. Upper Abdomen: Limited images of the upper abdomen are unremarkable. Soft Tissues/Bones: No acute bone or soft tissue abnormality. CT HEAD: No evidence of intra or extra-axial hemorrhage. No evidence of masses or mass effects or shifts. Ventricular system is normal and symmetrical.  The skull paranasal sinuses and orbits appear unremarkable. No evidence of pulmonary embolism or acute pulmonary abnormality. Minimal atelectatic changes. No acute intracranial abnormality. XR CHEST PORTABLE    Result Date: 4/25/2022  EXAMINATION: ONE XRAY VIEW OF THE CHEST 4/25/2022 7:33 am COMPARISON: December 13, 2017 HISTORY: ORDERING SYSTEM PROVIDED HISTORY: Chest pain. TECHNOLOGIST PROVIDED HISTORY: Reason for exam:->chest pain Reason for Exam: Chest pain. FINDINGS: The cardiomediastinal silhouette is normal in size. The lungs are clear. No pleural effusion or pneumothorax is present. No acute cardiopulmonary process. CTA CHEST W CONTRAST    Result Date: 4/25/2022  EXAMINATION: CTA OF THE CHEST; CT OF THE HEAD WITHOUT CONTRAST 4/25/2022 7:58 am; 4/25/2022 7:57 am TECHNIQUE: CTA of the chest was performed after the administration of intravenous contrast.  Multiplanar reformatted images are provided for review. MIP images are provided for review. Dose modulation, iterative reconstruction, and/or weight based adjustment of the mA/kV was utilized to reduce the radiation dose to as low as reasonably achievable.; CT of the head was performed without the administration of intravenous contrast. Dose modulation, iterative reconstruction, and/or weight based adjustment of the mA/kV was utilized to reduce the radiation dose to as low as reasonably achievable. COMPARISON: 08/11/2019 and February 15, 2010.  HISTORY: ORDERING SYSTEM PROVIDED HISTORY: Chest pain, SOB, tachycardia. TECHNOLOGIST PROVIDED HISTORY: Reason for exam:->Chest pain, SOB, tachycardia Decision Support Exception - unselect if not a suspected or confirmed emergency medical condition->Emergency Medical Condition (MA) Reason for Exam: Chest pain, SOB, tachycardia. Additional signs and symptoms: NONE Relevant Medical/Surgical History: 75ML ISOVUE 370/ GFR>60; ORDERING SYSTEM PROVIDED HISTORY: head pain TECHNOLOGIST PROVIDED HISTORY: Has a \"code stroke\" or \"stroke alert\" been called? ->No Reason for exam:->Head pain Decision Support Exception - unselect if not a suspected or confirmed emergency medical condition->Emergency Medical Condition (MA) Reason for Exam: HEADACHE Additional signs and symptoms: NONE Relevant Medical/Surgical History: NONE FINDINGS: CTA CHEST: Pulmonary Arteries: Pulmonary arteries are adequately opacified for evaluation. No evidence of intraluminal filling defect to suggest pulmonary embolism. Main pulmonary artery is normal in caliber. Mediastinum: No evidence of mediastinal lymphadenopathy. The heart and pericardium demonstrate no acute abnormality. There is no acute abnormality of the thoracic aorta. Lungs/pleura: The lungs are without acute process. No focal consolidation or pulmonary edema. No evidence of pleural effusion or pneumothorax. Minimal atelectatic changes lower lobes. Upper Abdomen: Limited images of the upper abdomen are unremarkable. Soft Tissues/Bones: No acute bone or soft tissue abnormality. CT HEAD: No evidence of intra or extra-axial hemorrhage. No evidence of masses or mass effects or shifts. Ventricular system is normal and symmetrical.  The skull paranasal sinuses and orbits appear unremarkable. No evidence of pulmonary embolism or acute pulmonary abnormality. Minimal atelectatic changes. No acute intracranial abnormality.      CTA HEAD NECK W CONTRAST    Result Date: 4/25/2022  EXAMINATION: CTA OF THE HEAD AND NECK WITH CONTRAST 4/25/2022 9:09 am: TECHNIQUE: CTA of the head and neck was performed with the administration of intravenous contrast. Multiplanar reformatted images are provided for review. MIP images are provided for review. Stenosis of the internal carotid arteries measured using NASCET criteria. Dose modulation, iterative reconstruction, and/or weight based adjustment of the mA/kV was utilized to reduce the radiation dose to as low as reasonably achievable. COMPARISON: CT brain performed 04/25/2022. HISTORY: ORDERING SYSTEM PROVIDED HISTORY: headache, dizziness improving TECHNOLOGIST PROVIDED HISTORY: Reason for exam:->headache, dizziness improving Has a \"code stroke\" or \"stroke alert\" been called? ->No Decision Support Exception - unselect if not a suspected or confirmed emergency medical condition->Emergency Medical Condition (MA) Reason for Exam: headache, dizziness improving Additional signs and symptoms: NONE Relevant Medical/Surgical History: 75ML ISOVUE 370/ GFR>60 FINDINGS: CTA NECK: AORTIC ARCH/ARCH VESSELS: No dissection or arterial injury. No significant stenosis of the brachiocephalic or subclavian arteries. CAROTID ARTERIES: No dissection, arterial injury, or hemodynamically significant stenosis by NASCET criteria. VERTEBRAL ARTERIES: No dissection, arterial injury, or significant stenosis. SOFT TISSUES: The lung apices are clear. No cervical or superior mediastinal lymphadenopathy. The larynx and pharynx are unremarkable. No acute abnormality of the salivary and thyroid glands. BONES: No acute osseous abnormality. CTA HEAD: ANTERIOR CIRCULATION: No significant stenosis of the intracranial internal carotid, anterior cerebral, or middle cerebral arteries. No aneurysm. POSTERIOR CIRCULATION: No significant stenosis of the vertebral, basilar, or posterior cerebral arteries. No aneurysm. OTHER: No dural venous sinus thrombosis on this non-dedicated study. BRAIN: No mass effect or midline shift.  No extra-axial fluid collection. The gray-white differentiation is maintained. Unremarkable CTA of the head and neck.        Personally reviewed Lab Studies, Imaging, and discussed case with Dr. Marc Sheehan PA-C  Hospitalist  4/25/2022, 10:14 AM

## 2022-04-25 NOTE — CONSULTS
CARDIOLOGY CONSULT NOTE   Reason for consultation:  Chest pain    Referring physician:  Warren Champion DO     Primary care physician: No primary care provider on file. Dear  Dr. Warren Champion DO   Thanks for the consult. Chief Complaints :  Chief Complaint   Patient presents with    Chest Pain        History of present Damon Muñoz is a 74 y. o.year old who presents with complaints of severe chest pain which went to her right side of her jaw as well as her back started out of the blue around 4 AM in the morning. The pain was intense 8 out of 10 lasted almost to 1 to 2 hours she says she sat up immediately and felt the room started spinning she got dizzy and then was nauseous. The pain was intermittent and cramp-like in nature  Blood pressure in the emergency department was in 170s she was given Benadryl and Reglan which has helped she was also having some headaches  She says she got dizzy when she got up in room started spinning which made her nauseous  Normally not on any medication did have COVID earlier in this year but completely resolved now she has no shortness of breath no previous cardiac history not on any regular medication    Past medical history:    has a past medical history of Allergic rhinitis and Obesity. Past surgical history:   has a past surgical history that includes Hysterectomy, total abdominal and Tonsillectomy. Social History:   reports that she quit smoking about 30 years ago. Her smoking use included cigarettes. She has a 10.00 pack-year smoking history. She has never used smokeless tobacco. She reports that she does not drink alcohol and does not use drugs.   Family history:   no family history of CAD, STROKE of DM at early age    Allergies   Allergen Reactions    Pcn [Penicillins] Swelling and Rash    Sulfa Antibiotics Rash       sodium chloride flush 0.9 % injection 10 mL, 2 times per day  sodium chloride flush 0.9 % injection 10 mL, PRN  0.9 % sodium chloride infusion, PRN  nicotine (NICODERM CQ) 14 MG/24HR 1 patch, Daily  nicotine polacrilex (COMMIT) lozenge 2 mg, Q1H PRN  promethazine (PHENERGAN) tablet 12.5 mg, Q6H PRN   Or  ondansetron (ZOFRAN) injection 4 mg, Q6H PRN  acetaminophen (TYLENOL) tablet 650 mg, Q6H PRN   Or  acetaminophen (TYLENOL) suppository 650 mg, Q6H PRN  magnesium hydroxide (MILK OF MAGNESIA) 400 MG/5ML suspension 30 mL, Daily PRN  [START ON 4/26/2022] aspirin chewable tablet 81 mg, Daily  enoxaparin (LOVENOX) injection 40 mg, Daily  nitroGLYCERIN (NITROSTAT) SL tablet 0.4 mg, Q5 Min PRN  atorvastatin (LIPITOR) tablet 40 mg, Nightly  hydroCHLOROthiazide (HYDRODIURIL) tablet 25 mg, Daily  carvedilol (COREG) tablet 6.25 mg, BID WC      Current Facility-Administered Medications   Medication Dose Route Frequency Provider Last Rate Last Admin    sodium chloride flush 0.9 % injection 10 mL  10 mL IntraVENous 2 times per day Kellie Wallace PA-C        sodium chloride flush 0.9 % injection 10 mL  10 mL IntraVENous PRN Kellie Wallace PA-C        0.9 % sodium chloride infusion   IntraVENous PRN Kellie Wallace PA-C        nicotine (NICODERM CQ) 14 MG/24HR 1 patch  1 patch TransDERmal Daily Kellie Wallace PA-C        nicotine polacrilex (COMMIT) lozenge 2 mg  2 mg Oral Q1H PRN Kellie Wallace PA-C        promethazine (PHENERGAN) tablet 12.5 mg  12.5 mg Oral Q6H PRN Kellie Wallace PA-C        Or    ondansetron [facility]) injection 4 mg  4 mg IntraVENous Q6H PRN Kellie Wallace PA-C        acetaminophen (TYLENOL) tablet 650 mg  650 mg Oral Q6H PRN MARQUEZ Reardon-MARY ALICE        Or    acetaminophen (TYLENOL) suppository 650 mg  650 mg Rectal Q6H PRN Rasheeda Escobedo PA-C        magnesium hydroxide (MILK OF MAGNESIA) 400 MG/5ML suspension 30 mL  30 mL Oral Daily PRN Kellie Wallace PA-C        [START ON 4/26/2022] aspirin chewable tablet 81 mg  81 mg Oral Daily Kellie Wallace PA-C        enoxaparin (LOVENOX) injection 40 mg  40 mg SubCUTAneous Daily Rasheeda Escobedo PA-C  nitroGLYCERIN (NITROSTAT) SL tablet 0.4 mg  0.4 mg SubLINGual Q5 Min PRN Kellie Wallace PA-C        atorvastatin (LIPITOR) tablet 40 mg  40 mg Oral Nightly Kellie Wallace PA-C        hydroCHLOROthiazide (HYDRODIURIL) tablet 25 mg  25 mg Oral Daily Yury Li PA-C        carvedilol (COREG) tablet 6.25 mg  6.25 mg Oral BID KEVIN Moon MD         Review of Systems:   · Constitutional: No Fever or Weight Loss   · Eyes: No Decreased Vision  · ENT: No Headaches, Hearing Loss or Vertigo  · Cardiovascular: As per HPI  · Respiratory: As per HPI  · Gastrointestinal: No abdominal pain, appetite loss, blood in stools, constipation, diarrhea or heartburn  · Genitourinary: No dysuria, trouble voiding, or hematuria  · Musculoskeletal:  No gait disturbance, weakness or joint complaints  · Integumentary: No rash or pruritis  · Neurological: No TIA or stroke symptoms  · Psychiatric: No anxiety or depression  · Endocrine: No malaise, fatigue or temperature intolerance  · Hematologic/Lymphatic: No bleeding problems, blood clots or swollen lymph nodes  · Allergic/Immunologic: No nasal congestion or hives  All systems negative except as marked. Physical Examination:    Vitals:    04/25/22 1100 04/25/22 1130 04/25/22 1227 04/25/22 1547   BP: (!) 171/102 (!) 162/96 (!) 165/98 (!) 177/98   Pulse: 109 105 102    Resp: 20 21 17 16   Temp:   97.9 °F (36.6 °C)    TempSrc:   Oral    SpO2: 93% 95% 94% 92%   Weight:       Height:           General Appearance:  No distress, conversant    Constitutional:  Well developed, Well nourished, No acute distress, Non-toxic appearance. HENT:  Normocephalic, Atraumatic, Bilateral external ears normal, Oropharynx moist, No oral exudates, Nose normal. Neck- Normal range of motion, No tenderness, Supple, No stridor,no apical-carotid delay  Lymphatics : no palpable lymph nodes  Eyes:  PERRL, EOMI, Conjunctiva normal, No discharge.    Respiratory:  Normal breath sounds, No respiratory distress, No wheezing, No chest tenderness. ,no use of accessory muscles, crackles Absent   Cardiovascular: (PMI) apex non displaced,no lifts no thrills, ankle swelling Absent  , 1+, s1 and s2 audible,Murmur. Absent , JVD not noted    Abdomen /GI:  Bowel sounds normal, Soft, No tenderness, No masses, No gross visceromegaly   :  No costovertebral angle tenderness   Musculoskeletal:  No edema, no tenderness, no deformities. Back- no tenderness  Integument:  Well hydrated, no rash   Lymphatic:  No lymphadenopathy noted   Neurologic:  Alert & oriented x 3, CN 2-12 normal, normal motor function, normal sensory function, no focal deficits noted           Medical decision making and Data review:    Lab Review   Recent Labs     04/25/22  0646   WBC 6.8   HGB 15.4   HCT 47.6*         Recent Labs     04/25/22  0646      K 3.5      CO2 23   BUN 10   CREATININE 0.7     Recent Labs     04/25/22  0646   AST 21   ALT 24   BILITOT 0.4   ALKPHOS 113     Recent Labs     04/25/22  0646 04/25/22  1040 04/25/22  1604   TROPONINT <0.010 <0.010 <0.010       Recent Labs     04/25/22  0646   PROBNP 57.30     Lab Results   Component Value Date    INR 0.96 11/14/2010    PROTIME 10.4 11/14/2010       EKG: (reviewed by myself)    ECHO:(reviewed by myself)    Chest Xray:(reviewed by myself)  CT HEAD WO CONTRAST    Result Date: 4/25/2022  EXAMINATION: CTA OF THE CHEST; CT OF THE HEAD WITHOUT CONTRAST 4/25/2022 7:58 am; 4/25/2022 7:57 am TECHNIQUE: CTA of the chest was performed after the administration of intravenous contrast.  Multiplanar reformatted images are provided for review. MIP images are provided for review.  Dose modulation, iterative reconstruction, and/or weight based adjustment of the mA/kV was utilized to reduce the radiation dose to as low as reasonably achievable.; CT of the head was performed without the administration of intravenous contrast. Dose modulation, iterative reconstruction, and/or weight based adjustment of the mA/kV was utilized to reduce the radiation dose to as low as reasonably achievable. COMPARISON: 08/11/2019 and February 15, 2010. HISTORY: ORDERING SYSTEM PROVIDED HISTORY: Chest pain, SOB, tachycardia. TECHNOLOGIST PROVIDED HISTORY: Reason for exam:->Chest pain, SOB, tachycardia Decision Support Exception - unselect if not a suspected or confirmed emergency medical condition->Emergency Medical Condition (MA) Reason for Exam: Chest pain, SOB, tachycardia. Additional signs and symptoms: NONE Relevant Medical/Surgical History: 75ML ISOVUE 370/ GFR>60; ORDERING SYSTEM PROVIDED HISTORY: head pain TECHNOLOGIST PROVIDED HISTORY: Has a \"code stroke\" or \"stroke alert\" been called? ->No Reason for exam:->Head pain Decision Support Exception - unselect if not a suspected or confirmed emergency medical condition->Emergency Medical Condition (MA) Reason for Exam: HEADACHE Additional signs and symptoms: NONE Relevant Medical/Surgical History: NONE FINDINGS: CTA CHEST: Pulmonary Arteries: Pulmonary arteries are adequately opacified for evaluation. No evidence of intraluminal filling defect to suggest pulmonary embolism. Main pulmonary artery is normal in caliber. Mediastinum: No evidence of mediastinal lymphadenopathy. The heart and pericardium demonstrate no acute abnormality. There is no acute abnormality of the thoracic aorta. Lungs/pleura: The lungs are without acute process. No focal consolidation or pulmonary edema. No evidence of pleural effusion or pneumothorax. Minimal atelectatic changes lower lobes. Upper Abdomen: Limited images of the upper abdomen are unremarkable. Soft Tissues/Bones: No acute bone or soft tissue abnormality. CT HEAD: No evidence of intra or extra-axial hemorrhage. No evidence of masses or mass effects or shifts. Ventricular system is normal and symmetrical.  The skull paranasal sinuses and orbits appear unremarkable.      No evidence of pulmonary embolism or acute pulmonary abnormality. Minimal atelectatic changes. No acute intracranial abnormality. XR CHEST PORTABLE    Result Date: 4/25/2022  EXAMINATION: ONE XRAY VIEW OF THE CHEST 4/25/2022 7:33 am COMPARISON: December 13, 2017 HISTORY: ORDERING SYSTEM PROVIDED HISTORY: Chest pain. TECHNOLOGIST PROVIDED HISTORY: Reason for exam:->chest pain Reason for Exam: Chest pain. FINDINGS: The cardiomediastinal silhouette is normal in size. The lungs are clear. No pleural effusion or pneumothorax is present. No acute cardiopulmonary process. CTA CHEST W CONTRAST    Result Date: 4/25/2022  EXAMINATION: CTA OF THE CHEST; CT OF THE HEAD WITHOUT CONTRAST 4/25/2022 7:58 am; 4/25/2022 7:57 am TECHNIQUE: CTA of the chest was performed after the administration of intravenous contrast.  Multiplanar reformatted images are provided for review. MIP images are provided for review. Dose modulation, iterative reconstruction, and/or weight based adjustment of the mA/kV was utilized to reduce the radiation dose to as low as reasonably achievable.; CT of the head was performed without the administration of intravenous contrast. Dose modulation, iterative reconstruction, and/or weight based adjustment of the mA/kV was utilized to reduce the radiation dose to as low as reasonably achievable. COMPARISON: 08/11/2019 and February 15, 2010. HISTORY: ORDERING SYSTEM PROVIDED HISTORY: Chest pain, SOB, tachycardia. TECHNOLOGIST PROVIDED HISTORY: Reason for exam:->Chest pain, SOB, tachycardia Decision Support Exception - unselect if not a suspected or confirmed emergency medical condition->Emergency Medical Condition (MA) Reason for Exam: Chest pain, SOB, tachycardia. Additional signs and symptoms: NONE Relevant Medical/Surgical History: 75ML ISOVUE 370/ GFR>60; ORDERING SYSTEM PROVIDED HISTORY: head pain TECHNOLOGIST PROVIDED HISTORY: Has a \"code stroke\" or \"stroke alert\" been called? ->No Reason for exam:->Head pain Decision Support Exception - unselect if not a suspected or confirmed emergency medical condition->Emergency Medical Condition (MA) Reason for Exam: HEADACHE Additional signs and symptoms: NONE Relevant Medical/Surgical History: NONE FINDINGS: CTA CHEST: Pulmonary Arteries: Pulmonary arteries are adequately opacified for evaluation. No evidence of intraluminal filling defect to suggest pulmonary embolism. Main pulmonary artery is normal in caliber. Mediastinum: No evidence of mediastinal lymphadenopathy. The heart and pericardium demonstrate no acute abnormality. There is no acute abnormality of the thoracic aorta. Lungs/pleura: The lungs are without acute process. No focal consolidation or pulmonary edema. No evidence of pleural effusion or pneumothorax. Minimal atelectatic changes lower lobes. Upper Abdomen: Limited images of the upper abdomen are unremarkable. Soft Tissues/Bones: No acute bone or soft tissue abnormality. CT HEAD: No evidence of intra or extra-axial hemorrhage. No evidence of masses or mass effects or shifts. Ventricular system is normal and symmetrical.  The skull paranasal sinuses and orbits appear unremarkable. No evidence of pulmonary embolism or acute pulmonary abnormality. Minimal atelectatic changes. No acute intracranial abnormality. CTA HEAD NECK W CONTRAST    Result Date: 4/25/2022  EXAMINATION: CTA OF THE HEAD AND NECK WITH CONTRAST 4/25/2022 9:09 am: TECHNIQUE: CTA of the head and neck was performed with the administration of intravenous contrast. Multiplanar reformatted images are provided for review. MIP images are provided for review. Stenosis of the internal carotid arteries measured using NASCET criteria. Dose modulation, iterative reconstruction, and/or weight based adjustment of the mA/kV was utilized to reduce the radiation dose to as low as reasonably achievable. COMPARISON: CT brain performed 04/25/2022.  HISTORY: ORDERING SYSTEM PROVIDED HISTORY: headache, dizziness improving TECHNOLOGIST PROVIDED HISTORY: Reason for exam:->headache, dizziness improving Has a \"code stroke\" or \"stroke alert\" been called? ->No Decision Support Exception - unselect if not a suspected or confirmed emergency medical condition->Emergency Medical Condition (MA) Reason for Exam: headache, dizziness improving Additional signs and symptoms: NONE Relevant Medical/Surgical History: 75ML ISOVUE 370/ GFR>60 FINDINGS: CTA NECK: AORTIC ARCH/ARCH VESSELS: No dissection or arterial injury. No significant stenosis of the brachiocephalic or subclavian arteries. CAROTID ARTERIES: No dissection, arterial injury, or hemodynamically significant stenosis by NASCET criteria. VERTEBRAL ARTERIES: No dissection, arterial injury, or significant stenosis. SOFT TISSUES: The lung apices are clear. No cervical or superior mediastinal lymphadenopathy. The larynx and pharynx are unremarkable. No acute abnormality of the salivary and thyroid glands. BONES: No acute osseous abnormality. CTA HEAD: ANTERIOR CIRCULATION: No significant stenosis of the intracranial internal carotid, anterior cerebral, or middle cerebral arteries. No aneurysm. POSTERIOR CIRCULATION: No significant stenosis of the vertebral, basilar, or posterior cerebral arteries. No aneurysm. OTHER: No dural venous sinus thrombosis on this non-dedicated study. BRAIN: No mass effect or midline shift. No extra-axial fluid collection. The gray-white differentiation is maintained. Unremarkable CTA of the head and neck. All labs, medications and tests reviewed by myself including data  from outside source , patient and available family . Continue all other medications of all above medical condition listed as is. Impression:  Principal Problem:    Chest pain  Active Problems:    Dizziness    Headache  Resolved Problems:    * No resolved hospital problems. *      Assessment: 74 y. o.year old with PMH of  has a past medical history of Allergic rhinitis and Obesity. Plan and Recommendations:    Elevated Troponin : Check serial troponin  Plan stress test  Chest Pain: serial cardiac enzymes, EKG reviewed, further plan as per enzymes and clinical course  Uncontrolled hypertension start carvedilol 6.25 twice daily and titrate up as needed and hydrochlorothiazide  HTN: stable, continue present medications and add  Headaches: As per primary team  Reported dizziness described as room spinning or vertigo-like symptoms agree with Reglan and meclizine as needed possible vertigo, check orthostatics  DVT prophylaxis if no contraindication  6. Dyslipidemia: Check lipid panel          Thank you  much for consult and giving us the opportunity in contributing in the care of this patient. Please feel free to call me for any questions.        Blaise Head MD, 4/25/2022 5:25 PM

## 2022-04-25 NOTE — LETTER
[address] [address] [place] [address]  Phone: [phone]    No name on file. April 27, 2022     Patient: Lennox Mesi   YOB: 1955   Date of Visit: 4/25/2022       To Whom It May Concern: It is my medical opinion that [place] may return to full duty immediately with no restrictions. Patient may return to work 4/29/2022. If you have any questions or concerns, please don't hesitate to call.     Sincerely,    Santina Aschoff PA

## 2022-04-25 NOTE — ED NOTES
[id number] paged hospitalist     Dian Smith  04/25/22 [id number] Paul Muhammad with [facility] returned call      Dian Smith  04/25/22 [id number]

## 2022-04-26 ENCOUNTER — APPOINTMENT (OUTPATIENT)
Dept: MRI IMAGING | Age: 67
End: 2022-04-26
Payer: COMMERCIAL

## 2022-04-26 ENCOUNTER — APPOINTMENT (OUTPATIENT)
Dept: NUCLEAR MEDICINE | Age: 67
End: 2022-04-26
Payer: COMMERCIAL

## 2022-04-26 LAB
ALBUMIN SERPL-MCNC: 4.3 GM/DL (ref 3.4–5)
ALP BLD-CCNC: 103 IU/L (ref 40–128)
ALT SERPL-CCNC: 21 U/L (ref 10–40)
ANION GAP SERPL CALCULATED.3IONS-SCNC: 13 MMOL/L (ref 4–16)
AST SERPL-CCNC: 17 IU/L (ref 15–37)
BASOPHILS ABSOLUTE: 0 K/CU MM
BASOPHILS RELATIVE PERCENT: 0.5 % (ref 0–1)
BILIRUB SERPL-MCNC: 0.5 MG/DL (ref 0–1)
BUN BLDV-MCNC: 10 MG/DL (ref 6–23)
CALCIUM SERPL-MCNC: 9.2 MG/DL (ref 8.3–10.6)
CHLORIDE BLD-SCNC: 100 MMOL/L (ref 99–110)
CHOLESTEROL: 197 MG/DL
CO2: 28 MMOL/L (ref 21–32)
CREAT SERPL-MCNC: 0.7 MG/DL (ref 0.6–1.1)
DIFFERENTIAL TYPE: ABNORMAL
EOSINOPHILS ABSOLUTE: 0.2 K/CU MM
EOSINOPHILS RELATIVE PERCENT: 3.4 % (ref 0–3)
ESTIMATED AVERAGE GLUCOSE: 105 MG/DL
GFR AFRICAN AMERICAN: >60 ML/MIN/1.73M2
GFR NON-AFRICAN AMERICAN: >60 ML/MIN/1.73M2
GLUCOSE BLD-MCNC: 109 MG/DL (ref 70–99)
HBA1C MFR BLD: 5.3 % (ref 4.2–6.3)
HCT VFR BLD CALC: 44.5 % (ref 37–47)
HDLC SERPL-MCNC: 63 MG/DL
HEMOGLOBIN: 14.6 GM/DL (ref 12.5–16)
IMMATURE NEUTROPHIL %: 0.5 % (ref 0–0.43)
LDL CHOLESTEROL CALCULATED: 108 MG/DL
LV EF: 53 %
LV EF: 68 %
LVEF MODALITY: NORMAL
LVEF MODALITY: NORMAL
LYMPHOCYTES ABSOLUTE: 2.1 K/CU MM
LYMPHOCYTES RELATIVE PERCENT: 31.9 % (ref 24–44)
MAGNESIUM: 2.1 MG/DL (ref 1.8–2.4)
MCH RBC QN AUTO: 31.3 PG (ref 27–31)
MCHC RBC AUTO-ENTMCNC: 32.8 % (ref 32–36)
MCV RBC AUTO: 95.5 FL (ref 78–100)
MONOCYTES ABSOLUTE: 0.4 K/CU MM
MONOCYTES RELATIVE PERCENT: 6.1 % (ref 0–4)
NUCLEATED RBC %: 0 %
PDW BLD-RTO: 13.1 % (ref 11.7–14.9)
PLATELET # BLD: 207 K/CU MM (ref 140–440)
PMV BLD AUTO: 9.8 FL (ref 7.5–11.1)
POTASSIUM SERPL-SCNC: 3.3 MMOL/L (ref 3.5–5.1)
RBC # BLD: 4.66 M/CU MM (ref 4.2–5.4)
SEGMENTED NEUTROPHILS ABSOLUTE COUNT: 3.7 K/CU MM
SEGMENTED NEUTROPHILS RELATIVE PERCENT: 57.6 % (ref 36–66)
SODIUM BLD-SCNC: 141 MMOL/L (ref 135–145)
TOTAL IMMATURE NEUTOROPHIL: 0.03 K/CU MM
TOTAL NUCLEATED RBC: 0 K/CU MM
TOTAL PROTEIN: 6.3 GM/DL (ref 6.4–8.2)
TRIGL SERPL-MCNC: 129 MG/DL
WBC # BLD: 6.4 K/CU MM (ref 4–10.5)

## 2022-04-26 PROCEDURE — 3430000000 HC RX DIAGNOSTIC RADIOPHARMACEUTICAL: Performed by: INTERNAL MEDICINE

## 2022-04-26 PROCEDURE — 99215 OFFICE O/P EST HI 40 MIN: CPT | Performed by: INTERNAL MEDICINE

## 2022-04-26 PROCEDURE — A9500 TC99M SESTAMIBI: HCPCS | Performed by: INTERNAL MEDICINE

## 2022-04-26 PROCEDURE — 83036 HEMOGLOBIN GLYCOSYLATED A1C: CPT

## 2022-04-26 PROCEDURE — 93017 CV STRESS TEST TRACING ONLY: CPT

## 2022-04-26 PROCEDURE — 6370000000 HC RX 637 (ALT 250 FOR IP): Performed by: PHYSICIAN ASSISTANT

## 2022-04-26 PROCEDURE — 2580000003 HC RX 258: Performed by: PHYSICIAN ASSISTANT

## 2022-04-26 PROCEDURE — G0378 HOSPITAL OBSERVATION PER HR: HCPCS

## 2022-04-26 PROCEDURE — 36415 COLL VENOUS BLD VENIPUNCTURE: CPT

## 2022-04-26 PROCEDURE — 6360000002 HC RX W HCPCS: Performed by: INTERNAL MEDICINE

## 2022-04-26 PROCEDURE — 70551 MRI BRAIN STEM W/O DYE: CPT

## 2022-04-26 PROCEDURE — 80053 COMPREHEN METABOLIC PANEL: CPT

## 2022-04-26 PROCEDURE — 80061 LIPID PANEL: CPT

## 2022-04-26 PROCEDURE — 94761 N-INVAS EAR/PLS OXIMETRY MLT: CPT

## 2022-04-26 PROCEDURE — 6370000000 HC RX 637 (ALT 250 FOR IP)

## 2022-04-26 PROCEDURE — 78452 HT MUSCLE IMAGE SPECT MULT: CPT

## 2022-04-26 PROCEDURE — 85025 COMPLETE CBC W/AUTO DIFF WBC: CPT

## 2022-04-26 PROCEDURE — 6370000000 HC RX 637 (ALT 250 FOR IP): Performed by: INTERNAL MEDICINE

## 2022-04-26 PROCEDURE — 83735 ASSAY OF MAGNESIUM: CPT

## 2022-04-26 PROCEDURE — 93306 TTE W/DOPPLER COMPLETE: CPT

## 2022-04-26 RX ORDER — CARVEDILOL 6.25 MG/1
12.5 TABLET ORAL 2 TIMES DAILY WITH MEALS
Status: DISCONTINUED | OUTPATIENT
Start: 2022-04-26 | End: 2022-04-27 | Stop reason: HOSPADM

## 2022-04-26 RX ADMIN — REGADENOSON 0.4 MG: 0.08 INJECTION, SOLUTION INTRAVENOUS at 09:40

## 2022-04-26 RX ADMIN — CARVEDILOL 12.5 MG: 6.25 TABLET, FILM COATED ORAL at 16:29

## 2022-04-26 RX ADMIN — POTASSIUM BICARBONATE 20 MEQ: 782 TABLET, EFFERVESCENT ORAL at 11:01

## 2022-04-26 RX ADMIN — KIT FOR THE PREPARATION OF TECHNETIUM TC99M SESTAMIBI 10 MILLICURIE: 1 INJECTION, POWDER, LYOPHILIZED, FOR SOLUTION PARENTERAL at 08:05

## 2022-04-26 RX ADMIN — SODIUM CHLORIDE, PRESERVATIVE FREE 10 ML: 5 INJECTION INTRAVENOUS at 11:01

## 2022-04-26 RX ADMIN — HYDROCHLOROTHIAZIDE 25 MG: 25 TABLET ORAL at 11:01

## 2022-04-26 RX ADMIN — ATORVASTATIN CALCIUM 40 MG: 40 TABLET, FILM COATED ORAL at 20:28

## 2022-04-26 RX ADMIN — CARVEDILOL 6.25 MG: 6.25 TABLET, FILM COATED ORAL at 11:01

## 2022-04-26 RX ADMIN — ASPIRIN 81 MG 81 MG: 81 TABLET ORAL at 11:01

## 2022-04-26 RX ADMIN — SODIUM CHLORIDE, PRESERVATIVE FREE 10 ML: 5 INJECTION INTRAVENOUS at 20:28

## 2022-04-26 RX ADMIN — KIT FOR THE PREPARATION OF TECHNETIUM TC99M SESTAMIBI 30 MILLICURIE: 1 INJECTION, POWDER, LYOPHILIZED, FOR SOLUTION PARENTERAL at 09:40

## 2022-04-26 ASSESSMENT — ENCOUNTER SYMPTOMS
CHEST TIGHTNESS: 0
SHORTNESS OF BREATH: 0
VOMITING: 0
DIARRHEA: 0
ABDOMINAL PAIN: 0

## 2022-04-26 ASSESSMENT — PAIN SCALES - GENERAL
PAINLEVEL_OUTOF10: 0
PAINLEVEL_OUTOF10: 0

## 2022-04-26 NOTE — PROGRESS NOTES
Hospitalist Progress Note      Name:  Luanne Dhillon /Age/Sex: 1955  (74 y.o. female)   MRN & CSN:  [id number] & [id number] Admission Date/Time: 2022  6:56 AM   Location:  [facility] PCP: No primary care provider on file. Hospital Day: 2                                               Attending Physician Elizabeth Bragg    Assessment and Plan:   Luanne Dhillon is a 74 y.o. female with pmh of COVID 23, allergies, who presents to the ER for evaluation of chest pain that began this morning as well as headache and dizziness. Patient states that the dizziness began while walking at [employer] with associated clamminess and sweating. She had no chest pain associated with the dizziness. She denies any recent medication changes, has normal diet. She denies any new stressors. No focal deficits, slurred speech, motor or sensory changes associated with the dizziness. Patient woke up with chest pain that was sharp and radiating into her right neck with associated dizziness and posterior headache. No vision changes, tinnitus, motor or sensory changes. No prior cardiac work-up. No previous history of CVA, diabetes, lung or cardiac disease. No recent travel, DVTs, surgeries, no lower extremity swelling. No nicotine use or other drug use. At this time, symptoms have resolved. Patient was found to be hypertensive and was started on Coreg and hydrochlorothiazide. Cardiology was consulted. Echo and stress were ordered and are pending. MRI brain is pending. Patient at baseline currently without symptoms of dizziness. Medical decision making:  · Chest pain r/o ACS  · Sinus tachycardia - resolved  ? No prior cardiac history. CTA PE negative. CXR negative. EKG with sinus tachycardia. Given 1 L NaCl in ER. TSH normal.   ? Negative troponin trend  ? Tele monitoring  ? Cardiology consulted  ? Echo pending, no previous echo  ? Stress pending  ?  Nitroglycerin sublingual PRN     · Hypertensive urgency  · Started on coreg and hydrochlorothiazide per cardiology  · Monitor    · Hypokalemia  · Replaced  · Recheck    · Hyperlipidemia  · Started on statin    · Headache and dizziness - resolved  ? Symptom onset 4/24 in afternoon, resolved spontaneously and returned this AM. Resolved at this time. CT head/CTA head and neck in ER negative. NIH 0. No focal neurological deficit on examination. ? MRI of brain pending  ? Hemoglobin A1c 5.3  ? Neuro consult if abnormalities found on MRI  ? Started aspirin and statin  ? Neurochecks  ? Telemetry monitoring     Other chronic conditions: home medications continued unless contraindicated - not on home medications    Diet Diet NPO Exceptions are: Sips of Water with Meds   DVT Prophylaxis [x] Lovenox, []  Heparin, [] SCDs, [] Ambulation   GI Prophylaxis [] PPI,  [] H2 Blocker,  [] Carafate,  [] Diet/Tube Feeds   Code Status Full Code   Disposition Patient requires continued admission due to MRI, echo,stress     -Patient assessment and plan discussed with supervising physician-  Overnight events:     Nadine Fong is a 74 y.o.  female, who presented with Chest Pain      Patient was seen and evaluated at bedside by myself. No acute overnight events. Patient reports feeling back at her baseline. Objective:   No intake or output data in the 24 hours ending 04/26/22 1213   Vitals:   Vitals:    04/26/22 0730 04/26/22 0745 04/26/22 0750 04/26/22 1130   BP: (!) 168/101   (!) 179/104   Pulse:  83 85 71   Resp: 17   17   Temp: 97.7 °F (36.5 °C)   97.7 °F (36.5 °C)   TempSrc: Oral   Oral   SpO2: 96%   96%   Weight:       Height:         Physical Exam: 04/26/22     General: NAD  Eyes: EOMI  ENT: neck supple  Cardiovascular: Regular rate. Respiratory: Clear to auscultation  Gastrointestinal: Soft, non tender  Genitourinary: no suprapubic tenderness  Musculoskeletal: No edema  Skin: warm, dry  Neuro: Alert. Psych: Mood appropriate.      Medications:   Medications:    sodium chloride flush  10 mL IntraVENous 2 times per day    aspirin  81 mg Oral Daily    enoxaparin  40 mg SubCUTAneous Daily    atorvastatin  40 mg Oral Nightly    hydroCHLOROthiazide  25 mg Oral Daily    carvedilol  6.25 mg Oral BID WC      Infusions:    sodium chloride       PRN Meds: sodium chloride flush, 10 mL, PRN  sodium chloride, , PRN  promethazine, 12.5 mg, Q6H PRN   Or  ondansetron, 4 mg, Q6H PRN  acetaminophen, 650 mg, Q6H PRN   Or  acetaminophen, 650 mg, Q6H PRN  magnesium hydroxide, 30 mL, Daily PRN  nitroGLYCERIN, 0.4 mg, Q5 Min PRN  meclizine, 12.5 mg, TID PRN        LABS  CBC   Recent Labs     04/25/22  0646 04/26/22  0247   WBC 6.8 6.4   HGB 15.4 14.6   HCT 47.6* 44.5    207      RENAL  Recent Labs     04/25/22  0646 04/26/22  0247    141   K 3.5 3.3*    100   CO2 23 28   BUN 10 10   CREATININE 0.7 0.7     LFT'S  Recent Labs     04/25/22  0646 04/26/22  0247   AST 21 17   ALT 24 21   BILITOT 0.4 0.5   ALKPHOS 113 103     COAG  No results for input(s): INR in the last 72 hours. CARDIAC ENZYMES  No results for input(s): CKTOTAL, CKMB, CKMBINDEX, TROPONINI in the last 72 hours. Radiology this visit:  Reviewed. CT HEAD WO CONTRAST    Result Date: 4/25/2022  EXAMINATION: CTA OF THE CHEST; CT OF THE HEAD WITHOUT CONTRAST 4/25/2022 7:58 am; 4/25/2022 7:57 am TECHNIQUE: CTA of the chest was performed after the administration of intravenous contrast.  Multiplanar reformatted images are provided for review. MIP images are provided for review. Dose modulation, iterative reconstruction, and/or weight based adjustment of the mA/kV was utilized to reduce the radiation dose to as low as reasonably achievable.; CT of the head was performed without the administration of intravenous contrast. Dose modulation, iterative reconstruction, and/or weight based adjustment of the mA/kV was utilized to reduce the radiation dose to as low as reasonably achievable. COMPARISON: 08/11/2019 and February 15, 2010.  HISTORY: ORDERING SYSTEM PROVIDED HISTORY: Chest pain, SOB, tachycardia. TECHNOLOGIST PROVIDED HISTORY: Reason for exam:->Chest pain, SOB, tachycardia Decision Support Exception - unselect if not a suspected or confirmed emergency medical condition->Emergency Medical Condition (MA) Reason for Exam: Chest pain, SOB, tachycardia. Additional signs and symptoms: NONE Relevant Medical/Surgical History: 75ML ISOVUE 370/ GFR>60; ORDERING SYSTEM PROVIDED HISTORY: head pain TECHNOLOGIST PROVIDED HISTORY: Has a \"code stroke\" or \"stroke alert\" been called? ->No Reason for exam:->Head pain Decision Support Exception - unselect if not a suspected or confirmed emergency medical condition->Emergency Medical Condition (MA) Reason for Exam: HEADACHE Additional signs and symptoms: NONE Relevant Medical/Surgical History: NONE FINDINGS: CTA CHEST: Pulmonary Arteries: Pulmonary arteries are adequately opacified for evaluation. No evidence of intraluminal filling defect to suggest pulmonary embolism. Main pulmonary artery is normal in caliber. Mediastinum: No evidence of mediastinal lymphadenopathy. The heart and pericardium demonstrate no acute abnormality. There is no acute abnormality of the thoracic aorta. Lungs/pleura: The lungs are without acute process. No focal consolidation or pulmonary edema. No evidence of pleural effusion or pneumothorax. Minimal atelectatic changes lower lobes. Upper Abdomen: Limited images of the upper abdomen are unremarkable. Soft Tissues/Bones: No acute bone or soft tissue abnormality. CT HEAD: No evidence of intra or extra-axial hemorrhage. No evidence of masses or mass effects or shifts. Ventricular system is normal and symmetrical.  The skull paranasal sinuses and orbits appear unremarkable. No evidence of pulmonary embolism or acute pulmonary abnormality. Minimal atelectatic changes. No acute intracranial abnormality.      XR CHEST PORTABLE    Result Date: 4/25/2022  EXAMINATION: ONE XRAY VIEW OF THE CHEST 4/25/2022 7:33 am COMPARISON: December 13, 2017 HISTORY: ORDERING SYSTEM PROVIDED HISTORY: Chest pain. TECHNOLOGIST PROVIDED HISTORY: Reason for exam:->chest pain Reason for Exam: Chest pain. FINDINGS: The cardiomediastinal silhouette is normal in size. The lungs are clear. No pleural effusion or pneumothorax is present. No acute cardiopulmonary process. CTA CHEST W CONTRAST    Result Date: 4/25/2022  EXAMINATION: CTA OF THE CHEST; CT OF THE HEAD WITHOUT CONTRAST 4/25/2022 7:58 am; 4/25/2022 7:57 am TECHNIQUE: CTA of the chest was performed after the administration of intravenous contrast.  Multiplanar reformatted images are provided for review. MIP images are provided for review. Dose modulation, iterative reconstruction, and/or weight based adjustment of the mA/kV was utilized to reduce the radiation dose to as low as reasonably achievable.; CT of the head was performed without the administration of intravenous contrast. Dose modulation, iterative reconstruction, and/or weight based adjustment of the mA/kV was utilized to reduce the radiation dose to as low as reasonably achievable. COMPARISON: 08/11/2019 and February 15, 2010. HISTORY: ORDERING SYSTEM PROVIDED HISTORY: Chest pain, SOB, tachycardia. TECHNOLOGIST PROVIDED HISTORY: Reason for exam:->Chest pain, SOB, tachycardia Decision Support Exception - unselect if not a suspected or confirmed emergency medical condition->Emergency Medical Condition (MA) Reason for Exam: Chest pain, SOB, tachycardia. Additional signs and symptoms: NONE Relevant Medical/Surgical History: 75ML ISOVUE 370/ GFR>60; ORDERING SYSTEM PROVIDED HISTORY: head pain TECHNOLOGIST PROVIDED HISTORY: Has a \"code stroke\" or \"stroke alert\" been called? ->No Reason for exam:->Head pain Decision Support Exception - unselect if not a suspected or confirmed emergency medical condition->Emergency Medical Condition (MA) Reason for Exam: HEADACHE Additional signs and symptoms: NONE Relevant Medical/Surgical History: NONE FINDINGS: CTA CHEST: Pulmonary Arteries: Pulmonary arteries are adequately opacified for evaluation. No evidence of intraluminal filling defect to suggest pulmonary embolism. Main pulmonary artery is normal in caliber. Mediastinum: No evidence of mediastinal lymphadenopathy. The heart and pericardium demonstrate no acute abnormality. There is no acute abnormality of the thoracic aorta. Lungs/pleura: The lungs are without acute process. No focal consolidation or pulmonary edema. No evidence of pleural effusion or pneumothorax. Minimal atelectatic changes lower lobes. Upper Abdomen: Limited images of the upper abdomen are unremarkable. Soft Tissues/Bones: No acute bone or soft tissue abnormality. CT HEAD: No evidence of intra or extra-axial hemorrhage. No evidence of masses or mass effects or shifts. Ventricular system is normal and symmetrical.  The skull paranasal sinuses and orbits appear unremarkable. No evidence of pulmonary embolism or acute pulmonary abnormality. Minimal atelectatic changes. No acute intracranial abnormality. CTA HEAD NECK W CONTRAST    Result Date: 4/25/2022  EXAMINATION: CTA OF THE HEAD AND NECK WITH CONTRAST 4/25/2022 9:09 am: TECHNIQUE: CTA of the head and neck was performed with the administration of intravenous contrast. Multiplanar reformatted images are provided for review. MIP images are provided for review. Stenosis of the internal carotid arteries measured using NASCET criteria. Dose modulation, iterative reconstruction, and/or weight based adjustment of the mA/kV was utilized to reduce the radiation dose to as low as reasonably achievable. COMPARISON: CT brain performed 04/25/2022. HISTORY: ORDERING SYSTEM PROVIDED HISTORY: headache, dizziness improving TECHNOLOGIST PROVIDED HISTORY: Reason for exam:->headache, dizziness improving Has a \"code stroke\" or \"stroke alert\" been called? ->No Decision Support Exception - unselect if not a suspected or confirmed emergency medical condition->Emergency Medical Condition (MA) Reason for Exam: headache, dizziness improving Additional signs and symptoms: NONE Relevant Medical/Surgical History: 75ML ISOVUE 370/ GFR>60 FINDINGS: CTA NECK: AORTIC ARCH/ARCH VESSELS: No dissection or arterial injury. No significant stenosis of the brachiocephalic or subclavian arteries. CAROTID ARTERIES: No dissection, arterial injury, or hemodynamically significant stenosis by NASCET criteria. VERTEBRAL ARTERIES: No dissection, arterial injury, or significant stenosis. SOFT TISSUES: The lung apices are clear. No cervical or superior mediastinal lymphadenopathy. The larynx and pharynx are unremarkable. No acute abnormality of the salivary and thyroid glands. BONES: No acute osseous abnormality. CTA HEAD: ANTERIOR CIRCULATION: No significant stenosis of the intracranial internal carotid, anterior cerebral, or middle cerebral arteries. No aneurysm. POSTERIOR CIRCULATION: No significant stenosis of the vertebral, basilar, or posterior cerebral arteries. No aneurysm. OTHER: No dural venous sinus thrombosis on this non-dedicated study. BRAIN: No mass effect or midline shift. No extra-axial fluid collection. The gray-white differentiation is maintained. Unremarkable CTA of the head and neck. NM MYOCARDIAL SPECT REST EXERCISE OR RX    Result Date: 4/26/2022  Radiology exam is complete. No Radiologist dictation. Please follow up with ordering provider.        Pamela Martin PA-C  Hospitalist  4/26/2022, 12:13 PM

## 2022-04-26 NOTE — CARE COORDINATION
Chart reviewed and met w/ pt to initiate discharge planning. CM introduced self and explained role. Pt is from home and was independent PTA. Pt does not have PCP, PCP list added to AVS at this time. Pt has insurance with affordable RX copays. Pt declined any other needs from CM at this time. CM available should needs present.

## 2022-04-26 NOTE — PROGRESS NOTES
[facility] ([facility] [facility],Third [address]  Phone: [phone]    Fax [phone]                  Florentino Rodrigues MD, Lorraine Meehan MD, [address], MD, MD Pietro Briceño [place], MD David Corral, MD Ruthy Ritter, MD Adriano Funk, ROBYN Ham, ROBYN Snow, ROBYN Reinoso, ROBYN Coombs PA-C    CARDIOLOGY  NOTE      Name:  Josi Benavidez /Age/Sex: 1955  (74 y.o. female)   MRN & CSN:  [id number] & [id number] Admission Date/Time: 2022  6:56 AM   Location:  [address] [place], [place] [address] PCP: No primary care provider on file. Hospital Day: 2  CARDIOLOGY ATTENDING ADDENDUM    MEDICAL DECISION MAKING;  1. Normal stress test   2. Uncontrolled hypertension, incraease   carvedilol 12.5  twice daily and titrate up as needed and hydrochlorothiazide  3. HTN: still high   4. Headaches: As per primary team  5. Reported dizziness described as room spinning or vertigo-like symptoms agree with Reglan and meclizine as needed possible vertigo, check orthostatics  6. DVT prophylaxis if no contraindication  6. Dyslipidemia: Check lipid panel               HPI:  I have reviewed the HPI  And agree with above   Sam Red is a 74 y. o.year old who and presents with had concerns including Chest Pain.   Chief Complaint   Patient presents with    Chest Pain     Please review addendum/changes made to note above   Interval history:  Normal stress test     Physical Exam:  General:  Awake, alert, NAD  Head:normal  Eye:normal  Neck:  No JVD   Chest:  Clear to auscultation, respiration easy  Cardiovascular:  S1 and S2 audible, No added heart sounds, No signs of ankle edema, or volume overload, No evidence of JVD, No crackles  Abdomen:   nontender  Extremities:  no* edema  Pulses; palpable  Neuro: grossly normal        I agree with the plan, which was planned by myself and discussed with advanced level provider. My documented MDM is a substantive portion of the supervisory note. I have seen ,spoken to  and examined this patient personally, independently of the advanced level provider. I have spent substantiate  portion of this encounter independently myself in examining patient and developing the medical management plan . I have reviewed the hospital care given to date and reviewed all pertinent labs and imaging. The plan was developed mutually at the time of the visit with the patient,  NP /PA  and myself. I have spoken with patient, nursing staff and provided written and verbal instructions . The above note has been reviewed and I agree with the assessment, diagnosis, and treatment plan with changes made by me as follows . Mayelin Curiel MD [facility] 04/26/22       PLAN FROM CARDIOLOGY FOR TODAY: Stress and echo    - cardiology consult is for: Chest pain elevated troponin    -  Interval history: Troponin negative x3. Total cholesterol 197, HDL 63, LDL of 108, triglycerides of 129. Hemoglobin of 14.6    · ASSESSMENT/ PLAN:  1. Chest pain without prior history of coronary artery disease    Continue aspirin and Lipitor 40 mg.  2. Elevated blood pressure  -BP of 168/101. Continue hydrochlorothiazide 25 mg daily. Increase coreg to 12.5 mg twice daily  3. Headache  -Per primary care. MRI brain pending  4. Recent COVID-19 infection   -January 2022. Patient states that she has not felt the same since the infection. 5. DVT prophylaxis  -Continue Lovenox unless contraindicated          Subjective:  Eugenia Henry is a 74 y. o.year old who and presents with had concerns including Chest Pain. Chief Complaint   Patient presents with    Chest Pain     Patient is improving pertinent. Her chest pain has been alleviated at this moment and she is not experiencing a headache, lightheadedness, or dizziness at this time.   However patient blood pressure still elevated and she is concerned about that, unsure why this is happening. Informed her that there is likely a gradual process and it could be related to her chest pain. Patient only received 1 dose of hydrochlorothiazide at this point in time, she was agreeable to reevaluate need for additional antihypertensives. Objective: Temperature:  Current - Temp: 97.7 °F (36.5 °C); Max - Temp  Av.8 °F (36.6 °C)  Min: 97.7 °F (36.5 °C)  Max: 97.9 °F (36.6 °C)    Respiratory Rate : Resp  Av.8  Min: 16  Max: 21    Pulse Range: Pulse  Av  Min: 74  Max: 122    Blood Presuure Range:  Systolic (16SLY), IYW:907 , Min:136 , YSY:357   ; Diastolic (01DXN), ZHC:50, Min:90, Max:102      Pulse ox Range: SpO2  Av %  Min: 92 %  Max: 98 %    24hr I & O:  No intake or output data in the 24 hours ending 22 0816      BP (!) 168/101   Pulse 85   Temp 97.7 °F (36.5 °C) (Oral)   Resp 17   Ht 5' 6\" (1.676 m)   Wt 185 lb (83.9 kg)   SpO2 96%   BMI 29.86 kg/m²           Review of Systems:   Review of Systems   Constitutional: Negative for diaphoresis, fatigue and fever. Eyes: Negative for visual disturbance. Respiratory: Negative for chest tightness and shortness of breath. Cardiovascular: Negative for chest pain, palpitations and leg swelling. Gastrointestinal: Negative for abdominal pain, diarrhea and vomiting. Endocrine: Negative for cold intolerance and heat intolerance. Musculoskeletal: Negative for arthralgias. Skin: Negative for pallor and rash. Neurological: Negative for dizziness, syncope, light-headedness and headaches. Psychiatric/Behavioral: Negative for dysphoric mood. The patient is not nervous/anxious. TELEMETRY: Sinus      has a past medical history of Allergic rhinitis and Obesity. has a past surgical history that includes Hysterectomy, total abdominal and Tonsillectomy.     Physical Exam: Vitals reviewed  General:  Awake, alert, no apparent distress  Head: atraumatic, external ears normal, nose normal  Eye: Pupils equal and round, EOM intact, no discharge  Neck:  No JVD noted, no carotid bruit   Pulmonary:  Clear to auscultation, no signs of respiratory distress  Cardiovascular:  Regular rhythm and rate, S1 and S2 noted. No murmurs, rubs, or gallops  Abdomen:   nontender  Extremities:  no lower extremity edema  Pulses; carotid pulses palpable, radial pulses palpable, posterior tibial and dorsalis pedis pulses palpable  Neuro: AxO x 3. Can move all four extremities separately    Medications:    sodium chloride flush  10 mL IntraVENous 2 times per day    aspirin  81 mg Oral Daily    enoxaparin  40 mg SubCUTAneous Daily    atorvastatin  40 mg Oral Nightly    hydroCHLOROthiazide  25 mg Oral Daily    carvedilol  6.25 mg Oral BID WC      sodium chloride       sodium chloride flush, sodium chloride, promethazine **OR** ondansetron, acetaminophen **OR** acetaminophen, magnesium hydroxide, nitroGLYCERIN, meclizine    Lab Data:  CBC:   Recent Labs     04/25/22  0646 04/26/22  0247   WBC 6.8 6.4   HGB 15.4 14.6   HCT 47.6* 44.5   MCV 95.8 95.5    207     BMP:   Recent Labs     04/25/22  0646 04/26/22  0247    141   K 3.5 3.3*    100   CO2 23 28   BUN 10 10   CREATININE 0.7 0.7     LIVER PROFILE:   Recent Labs     04/25/22  0646 04/26/22  0247   AST 21 17   ALT 24 21   LIPASE 41  --    BILITOT 0.4 0.5   ALKPHOS 113 103     PT/INR: No results for input(s): PROTIME, INR in the last 72 hours. APTT: No results for input(s): APTT in the last 72 hours. BNP:  No results for input(s): BNP in the last 72 hours.   TROPONIN:   Recent Labs     04/25/22  0646 04/25/22  1040 04/25/22  1604   TROPONINT <0.010 <0.010 <0.010     Labs, consult, tests reviewed          Bárbara Aguilar PA-C, 4/26/2022 8:16 AM

## 2022-04-26 NOTE — PROGRESS NOTES
MRI called and notified that screening has been completed. They stated that they will try to take patient as soon as they can.  Pt updated

## 2022-04-26 NOTE — PROGRESS NOTES
Discussed MRI findings and stress test with patient. Patient has continued high blood pressure systolic > 472D. Patient agreed to stay overnight as blood pressure is trended and plan for AM DC.     Ene Orellana PA-C  Hospitalist  4/26/2022, 3:17 PM

## 2022-04-26 NOTE — PROGRESS NOTES
Aditya ZAYAS states to hold off on PRN hydralazine until after 1700 coreg. Recheck BP after or more after given and then at that time if still elevated can give hydralazine.

## 2022-04-26 NOTE — PROGRESS NOTES
Stress test has area of shadow likely breast artifact. -may discharge home and follow up out patient

## 2022-04-27 VITALS
SYSTOLIC BLOOD PRESSURE: 154 MMHG | RESPIRATION RATE: 14 BRPM | HEART RATE: 68 BPM | BODY MASS INDEX: 32.54 KG/M2 | DIASTOLIC BLOOD PRESSURE: 100 MMHG | WEIGHT: 202.5 LBS | OXYGEN SATURATION: 94 % | TEMPERATURE: 97.3 F | HEIGHT: 66 IN

## 2022-04-27 PROBLEM — I16.0 HYPERTENSIVE URGENCY: Status: ACTIVE | Noted: 2022-04-27

## 2022-04-27 PROBLEM — I10 PRIMARY HYPERTENSION: Status: RESOLVED | Noted: 2022-04-25 | Resolved: 2022-04-27

## 2022-04-27 PROBLEM — E87.6 HYPOKALEMIA: Status: RESOLVED | Noted: 2022-04-27 | Resolved: 2022-04-27

## 2022-04-27 PROBLEM — E87.6 HYPOKALEMIA: Status: ACTIVE | Noted: 2022-04-27

## 2022-04-27 LAB
ALBUMIN SERPL-MCNC: 4.3 GM/DL (ref 3.4–5)
ALP BLD-CCNC: 112 IU/L (ref 40–128)
ALT SERPL-CCNC: 25 U/L (ref 10–40)
ANION GAP SERPL CALCULATED.3IONS-SCNC: 10 MMOL/L (ref 4–16)
AST SERPL-CCNC: 20 IU/L (ref 15–37)
BASOPHILS ABSOLUTE: 0 K/CU MM
BASOPHILS RELATIVE PERCENT: 0.6 % (ref 0–1)
BILIRUB SERPL-MCNC: 0.7 MG/DL (ref 0–1)
BUN BLDV-MCNC: 12 MG/DL (ref 6–23)
CALCIUM SERPL-MCNC: 9.5 MG/DL (ref 8.3–10.6)
CHLORIDE BLD-SCNC: 99 MMOL/L (ref 99–110)
CO2: 31 MMOL/L (ref 21–32)
CREAT SERPL-MCNC: 0.7 MG/DL (ref 0.6–1.1)
DIFFERENTIAL TYPE: ABNORMAL
EOSINOPHILS ABSOLUTE: 0.2 K/CU MM
EOSINOPHILS RELATIVE PERCENT: 3 % (ref 0–3)
GFR AFRICAN AMERICAN: >60 ML/MIN/1.73M2
GFR NON-AFRICAN AMERICAN: >60 ML/MIN/1.73M2
GLUCOSE BLD-MCNC: 117 MG/DL (ref 70–99)
HCT VFR BLD CALC: 48.3 % (ref 37–47)
HEMOGLOBIN: 15.5 GM/DL (ref 12.5–16)
IMMATURE NEUTROPHIL %: 0 % (ref 0–0.43)
LYMPHOCYTES ABSOLUTE: 1.5 K/CU MM
LYMPHOCYTES RELATIVE PERCENT: 28.1 % (ref 24–44)
MCH RBC QN AUTO: 31.2 PG (ref 27–31)
MCHC RBC AUTO-ENTMCNC: 32.1 % (ref 32–36)
MCV RBC AUTO: 97.2 FL (ref 78–100)
MONOCYTES ABSOLUTE: 0.4 K/CU MM
MONOCYTES RELATIVE PERCENT: 6.7 % (ref 0–4)
NUCLEATED RBC %: 0 %
PDW BLD-RTO: 12.8 % (ref 11.7–14.9)
PLATELET # BLD: 195 K/CU MM (ref 140–440)
PMV BLD AUTO: 9.7 FL (ref 7.5–11.1)
POTASSIUM SERPL-SCNC: 3.6 MMOL/L (ref 3.5–5.1)
RBC # BLD: 4.97 M/CU MM (ref 4.2–5.4)
SEGMENTED NEUTROPHILS ABSOLUTE COUNT: 3.3 K/CU MM
SEGMENTED NEUTROPHILS RELATIVE PERCENT: 61.6 % (ref 36–66)
SODIUM BLD-SCNC: 140 MMOL/L (ref 135–145)
TOTAL IMMATURE NEUTOROPHIL: 0 K/CU MM
TOTAL NUCLEATED RBC: 0 K/CU MM
TOTAL PROTEIN: 6.6 GM/DL (ref 6.4–8.2)
WBC # BLD: 5.4 K/CU MM (ref 4–10.5)

## 2022-04-27 PROCEDURE — 85025 COMPLETE CBC W/AUTO DIFF WBC: CPT

## 2022-04-27 PROCEDURE — 2580000003 HC RX 258: Performed by: PHYSICIAN ASSISTANT

## 2022-04-27 PROCEDURE — 6370000000 HC RX 637 (ALT 250 FOR IP)

## 2022-04-27 PROCEDURE — 80053 COMPREHEN METABOLIC PANEL: CPT

## 2022-04-27 PROCEDURE — 36415 COLL VENOUS BLD VENIPUNCTURE: CPT

## 2022-04-27 PROCEDURE — 6370000000 HC RX 637 (ALT 250 FOR IP): Performed by: PHYSICIAN ASSISTANT

## 2022-04-27 PROCEDURE — G0378 HOSPITAL OBSERVATION PER HR: HCPCS

## 2022-04-27 RX ORDER — ATORVASTATIN CALCIUM 40 MG/1
40 TABLET, FILM COATED ORAL NIGHTLY
Qty: 30 TABLET | Refills: 3 | Status: SHIPPED | OUTPATIENT
Start: 2022-04-27

## 2022-04-27 RX ORDER — CARVEDILOL 12.5 MG/1
12.5 TABLET ORAL 2 TIMES DAILY WITH MEALS
Qty: 60 TABLET | Refills: 3 | Status: SHIPPED | OUTPATIENT
Start: 2022-04-27

## 2022-04-27 RX ORDER — HYDROCHLOROTHIAZIDE 25 MG/1
25 TABLET ORAL DAILY
Qty: 30 TABLET | Refills: 3 | Status: SHIPPED | OUTPATIENT
Start: 2022-04-28

## 2022-04-27 RX ADMIN — ASPIRIN 81 MG 81 MG: 81 TABLET ORAL at 09:34

## 2022-04-27 RX ADMIN — HYDROCHLOROTHIAZIDE 25 MG: 25 TABLET ORAL at 09:34

## 2022-04-27 RX ADMIN — CARVEDILOL 12.5 MG: 6.25 TABLET, FILM COATED ORAL at 09:34

## 2022-04-27 RX ADMIN — SODIUM CHLORIDE, PRESERVATIVE FREE 10 ML: 5 INJECTION INTRAVENOUS at 09:34

## 2022-04-27 ASSESSMENT — PAIN SCALES - GENERAL
PAINLEVEL_OUTOF10: 0
PAINLEVEL_OUTOF10: 0

## 2022-04-27 NOTE — DISCHARGE SUMMARY
Discharge Summary    Name:  Megan Abrams /Age/Sex: 1955 (85 y.o. female)   Admit Date: 2022  Discharge Date: 22    MRN & CSN:  [id number] & [id number] Encounter Date and Time 22 9:39 AM EDT    Attending:  Natalia Christensen MD Discharging Provider: Carolyn Mathur [facility] Course:     Brief HPI: Tereso Nichols a 74 y. o. female with pmh of COVID 19, allergies, who presents to the ER for evaluation of chest pain that began this morning as well as headache and dizziness.  Patient states that the dizziness began while walking at [facility] with associated clamminess and sweating.  She had no chest pain associated with the dizziness.  She denies any recent medication changes, has normal diet.  She denies any new stressors.  No focal deficits, slurred speech, motor or sensory changes associated with the dizziness.  Patient woke up with chest pain that was sharp and radiating into her right neck with associated dizziness and posterior headache.  No vision changes, tinnitus, motor or sensory changes. No prior cardiac work-up.  No previous history of CVA, diabetes, lung or cardiac disease.  No recent travel, DVTs, surgeries, no lower extremity swelling.  No nicotine use or other drug use.  At this time, symptoms have resolved. Patient was found to be hypertensive and was started on Coreg and hydrochlorothiazide. Cardiology was consulted. Echo and stress were normal.  MRI brain was normal. Patient was kept overnight due to ongoing hypertension, but on recheck in AM, BP was improved to systolic in 836Z and patient was comfortable with discharge. Patient at baseline currently without symptoms of dizziness. Brief Problem Based Course:   · Chest pain r/o ACS  · Sinus tachycardia - resolved  ? No prior cardiac history.  CTA PE negative.  CXR negative. EKG with sinus tachycardia. Given 1 L NaCl in ER. TSH normal.   ? Negative troponin trend  ? Tele monitoring  ?  Cardiology consulted, cleared  ? Echo 4/27 EF 50-55%, G1DD with mild left ventricular hypertrophy  ? Stress negative  ? Nitroglycerin sublingual PRN     · Hypertensive urgency - resolved  ? Started on coreg and hydrochlorothiazide per cardiology, continue outpatient and F/u with PCP  ? Monitor     · Hypokalemia - resolved  ? Replaced  ? Recheck outpatient in 1 week, discussed with patient     · Hyperlipidemia  ? Started on statin     · Headache and dizziness - resolved  ? Likely 2/2 hypertensive urgency. Symptom onset 4/24 in afternoon, resolved spontaneously and returned this AM. Resolved at this time. CT head/CTA head and neck in ER negative. NIH 0. No focal neurological deficit on examination. ? MRI of brain normal  ? Hemoglobin A1c 5.3  ? Neurochecks  ? Telemetry monitoring     Other chronic conditions: home medications continued unless contraindicated - not on home medications    The patient expressed appropriate understanding of, and agreement with the discharge recommendations, medications, and plan. Consults this admission:  IP CONSULT TO HOSPITALIST  IP CONSULT TO CARDIOLOGY    Discharge Diagnosis:   Principal Problem:    Hypertensive urgency  Active Problems:    Chest pain    Dizziness    Headache  Resolved Problems:    Primary hypertension    Hypokalemia      Patient Active Problem List    Diagnosis Date Noted    Hypertensive urgency 04/27/2022    Chest pain 04/25/2022    Dizziness 04/25/2022    Headache 04/25/2022    Chronic back pain 05/30/2012       Discharge Instruction:   Follow up appointments: PCP - resources given  Primary care physician: No primary care provider on file.  within 2 weeks  Diet: regular diet   Activity: activity as tolerated  Disposition: Discharged to:   [x]Home, [][facility], []SNF, []Acute Rehab, []Hospice   Condition on discharge: Stable  Labs and Tests to be Followed up as an outpatient by PCP or Specialist: BMP  Discharge Medications:        Medication List      START taking these medications carvedilol 12.5 MG tablet  Commonly known as: COREG  Take 1 tablet by mouth 2 times daily (with meals)     hydroCHLOROthiazide 25 MG tablet  Commonly known as: HYDRODIURIL  Take 1 tablet by mouth daily  Start taking on: April 28, 2022        STOP taking these medications    celecoxib 200 MG capsule  Commonly known as: CeleBREX     dicyclomine 10 MG capsule  Commonly known as: Bentyl     metoprolol succinate 25 MG extended release tablet  Commonly known as: TOPROL XL     naproxen 500 MG tablet  Commonly known as: Naprosyn           Where to Get Your Medications      These medications were sent to [address] [place] [address] 48, [facility]    Phone: [phone]   · carvedilol 12.5 MG tablet  · hydroCHLOROthiazide 25 MG tablet        Objective Findings at Discharge:   BP (!) 150/94   Pulse 80   Temp 97.4 °F (36.3 °C) (Oral)   Resp 15   Ht 5' 6\" (1.676 m)   Wt 202 lb 8 oz (91.9 kg)   SpO2 92%   BMI 32.68 kg/m²   Physical Exam:   General: NAD  Eyes: EOMI  ENT: neck supple  Cardiovascular: Regular rate. Respiratory: Clear to auscultation  Gastrointestinal: Soft, non tender  Genitourinary: no suprapubic tenderness  Musculoskeletal: No edema  Skin: warm, dry  Neuro: Alert. Psych: Mood appropriate.   Labs and Imaging   Echocardiogram complete 2D with doppler with color    Result Date: 4/26/2022  Transthoracic Echocardiography Report (TTE)  Demographics   Patient Name       Afua Alcala      Date of Study       04/26/2022   Date of Birth      1955        Gender              Female   Age                74 year(s)        Race                   Patient Number     [id number]        Room Number         9983   Visit Number       [id number]   Corporate ID       [id number]   Accession Number   [id number]        Sonographer         Cady Stokes Halima Pardo RVT   Ordering Physician Corey Morel PA-C Interpreting        Radha Kraft MD  Procedure Type of Study   TTE procedure:ECHOCARDIOGRAM COMPLETE 2D W DOPPLER W COLOR. Procedure Date Date: 04/26/2022 Start: 07:19 AM Study Location: Portable Technical Quality: Fair visualization Indications:Chest pain. Patient Status: Routine Height: 66 inches Weight: 185 pounds BSA: 1.93 m2 BMI: 29.86 kg/m2 HR: 75 bpm BP: 168/101 mmHg  Conclusions   Summary  Left ventricular systolic function is normal.  Ejection fraction is visually estimated at 50-55%. Grade I diastolic dysfunction. Mild left ventricular hypertrophy. Sclerotic, but non-stenotic aortic valve. No evidence of any pericardial effusion. Signature   ------------------------------------------------------------------  Electronically signed by Radha Castro MD (Interpreting  physician) on 04/26/2022 at 05:45 PM  ------------------------------------------------------------------   Findings   Left Ventricle  Left ventricular systolic function is normal.  Ejection fraction is visually estimated at 50-55%. Grade I diastolic dysfunction. Mild left ventricular hypertrophy. No regional wall motion abnormalites. Left ventricle size is normal.   Left Atrium  Essentially normal left atrium. Right Atrium  Essentially normal right atrium. Right Ventricle  Mildly dilated right ventricle. Aortic Valve  Sclerotic, but non-stenotic aortic valve. Mitral Valve  Trace mitral regurgitation. Tricuspid Valve  Trace tricuspid regurgitation; normal RVSP. Pulmonic Valve  The pulmonic valve was not well visualized. Pericardial Effusion  No evidence of any pericardial effusion. Pleural Effusion  No evidence of pleural effusion. Miscellaneous  IVC and abdominal aorta are not clearly visualized due to poor subcostal  window.   M-Mode/2D Measurements & Calculations   LV Diastolic Dimension: LV Systolic Dimension:  LA Dimension: 3 cmAO Root  3.79 cm                  2.33 cm                 Dimension: 3.1 cmLA Area:  LV FS:38.5 %             LV Volume Diastolic: 57 38.4 cm2  LV PW Diastolic: 6.83 cm ml  LV PW Systolic: 5.90 cm  LV Volume Systolic: 26  Septum Diastolic: 8.35   ml  cm                       LV EDV/LV EDV Index: 57 RV Diastolic Dimension:  Septum Systolic: 1.7 cm  RD/21 M4XF ESV/LV ESV   3.67 cm  CO: 3.67 l/min           Index: 26 ml/13 m2  CI: 1.9 l/m*m2           EF Calculated (A4C):    LA/Aorta: 0.97                           54.4 %  LV Area Diastolic: 36.1  EF Calculated (2D):     LA volume/Index: 33 ml  cm2                      69.6 %                  /94Y2  LV Area Systolic: 45.1  cm2                      LV Length: 6.78 cm                            LVOT: 2 cm  Doppler Measurements & Calculations   MV Peak E-Wave: 50.8    AV Peak Velocity: 114 cm/s   LVOT Peak Velocity:  cm/s                    AV Peak Gradient: 5.2 mmHg   78.2 cm/s  MV Peak A-Wave: 90.3    AV Mean Velocity: 90.5 cm/s  LVOT Mean Velocity:  cm/s                    AV Mean Gradient: 3 mmHg     58.6 cm/s  MV E/A Ratio: 0.56      AV VTI: 25.1 cm              LVOT Peak Gradient: 2  MV Peak Gradient: 1.03  AV Area (Continuity):1.95    mmHgLVOT Mean Gradient:  mmHg                    cm2                          1 mmHg   MV P1/2t: 59 msec       LVOT VTI: 15.6 cm  MVA by PHT:3.73 cm2   MV E' Septal Velocity:  3.84 cm/s  MV E' Lateral Velocity:  4.72 cm/s  MV E/E' septal: 13.23  MV E/E' lateral: 10.76      CT HEAD WO CONTRAST    Result Date: 4/25/2022  EXAMINATION: CTA OF THE CHEST; CT OF THE HEAD WITHOUT CONTRAST 4/25/2022 7:58 am; 4/25/2022 7:57 am TECHNIQUE: CTA of the chest was performed after the administration of intravenous contrast.  Multiplanar reformatted images are provided for review. MIP images are provided for review.  Dose modulation, iterative reconstruction, and/or weight based adjustment of the mA/kV was utilized to reduce the radiation dose to as low as reasonably achievable.; CT of the head was performed without the administration of intravenous contrast. Dose modulation, iterative reconstruction, and/or weight based adjustment of the mA/kV was utilized to reduce the radiation dose to as low as reasonably achievable. COMPARISON: 08/11/2019 and February 15, 2010. HISTORY: ORDERING SYSTEM PROVIDED HISTORY: Chest pain, SOB, tachycardia. TECHNOLOGIST PROVIDED HISTORY: Reason for exam:->Chest pain, SOB, tachycardia Decision Support Exception - unselect if not a suspected or confirmed emergency medical condition->Emergency Medical Condition (MA) Reason for Exam: Chest pain, SOB, tachycardia. Additional signs and symptoms: NONE Relevant Medical/Surgical History: 75ML ISOVUE 370/ GFR>60; ORDERING SYSTEM PROVIDED HISTORY: head pain TECHNOLOGIST PROVIDED HISTORY: Has a \"code stroke\" or \"stroke alert\" been called? ->No Reason for exam:->Head pain Decision Support Exception - unselect if not a suspected or confirmed emergency medical condition->Emergency Medical Condition (MA) Reason for Exam: HEADACHE Additional signs and symptoms: NONE Relevant Medical/Surgical History: NONE FINDINGS: CTA CHEST: Pulmonary Arteries: Pulmonary arteries are adequately opacified for evaluation. No evidence of intraluminal filling defect to suggest pulmonary embolism. Main pulmonary artery is normal in caliber. Mediastinum: No evidence of mediastinal lymphadenopathy. The heart and pericardium demonstrate no acute abnormality. There is no acute abnormality of the thoracic aorta. Lungs/pleura: The lungs are without acute process. No focal consolidation or pulmonary edema. No evidence of pleural effusion or pneumothorax. Minimal atelectatic changes lower lobes. Upper Abdomen: Limited images of the upper abdomen are unremarkable. Soft Tissues/Bones: No acute bone or soft tissue abnormality.  CT HEAD: No evidence of intra or extra-axial hemorrhage. No evidence of masses or mass effects or shifts. Ventricular system is normal and symmetrical.  The skull paranasal sinuses and orbits appear unremarkable. No evidence of pulmonary embolism or acute pulmonary abnormality. Minimal atelectatic changes. No acute intracranial abnormality. XR CHEST PORTABLE    Result Date: 4/25/2022  EXAMINATION: ONE XRAY VIEW OF THE CHEST 4/25/2022 7:33 am COMPARISON: December 13, 2017 HISTORY: ORDERING SYSTEM PROVIDED HISTORY: Chest pain. TECHNOLOGIST PROVIDED HISTORY: Reason for exam:->chest pain Reason for Exam: Chest pain. FINDINGS: The cardiomediastinal silhouette is normal in size. The lungs are clear. No pleural effusion or pneumothorax is present. No acute cardiopulmonary process. CTA CHEST W CONTRAST    Result Date: 4/25/2022  EXAMINATION: CTA OF THE CHEST; CT OF THE HEAD WITHOUT CONTRAST 4/25/2022 7:58 am; 4/25/2022 7:57 am TECHNIQUE: CTA of the chest was performed after the administration of intravenous contrast.  Multiplanar reformatted images are provided for review. MIP images are provided for review. Dose modulation, iterative reconstruction, and/or weight based adjustment of the mA/kV was utilized to reduce the radiation dose to as low as reasonably achievable.; CT of the head was performed without the administration of intravenous contrast. Dose modulation, iterative reconstruction, and/or weight based adjustment of the mA/kV was utilized to reduce the radiation dose to as low as reasonably achievable. COMPARISON: 08/11/2019 and February 15, 2010. HISTORY: ORDERING SYSTEM PROVIDED HISTORY: Chest pain, SOB, tachycardia. TECHNOLOGIST PROVIDED HISTORY: Reason for exam:->Chest pain, SOB, tachycardia Decision Support Exception - unselect if not a suspected or confirmed emergency medical condition->Emergency Medical Condition (MA) Reason for Exam: Chest pain, SOB, tachycardia.  Additional signs and symptoms: NONE Relevant Medical/Surgical History: 75ML ISOVUE 370/ GFR>60; ORDERING SYSTEM PROVIDED HISTORY: head pain TECHNOLOGIST PROVIDED HISTORY: Has a \"code stroke\" or \"stroke alert\" been called? ->No Reason for exam:->Head pain Decision Support Exception - unselect if not a suspected or confirmed emergency medical condition->Emergency Medical Condition (MA) Reason for Exam: HEADACHE Additional signs and symptoms: NONE Relevant Medical/Surgical History: NONE FINDINGS: CTA CHEST: Pulmonary Arteries: Pulmonary arteries are adequately opacified for evaluation. No evidence of intraluminal filling defect to suggest pulmonary embolism. Main pulmonary artery is normal in caliber. Mediastinum: No evidence of mediastinal lymphadenopathy. The heart and pericardium demonstrate no acute abnormality. There is no acute abnormality of the thoracic aorta. Lungs/pleura: The lungs are without acute process. No focal consolidation or pulmonary edema. No evidence of pleural effusion or pneumothorax. Minimal atelectatic changes lower lobes. Upper Abdomen: Limited images of the upper abdomen are unremarkable. Soft Tissues/Bones: No acute bone or soft tissue abnormality. CT HEAD: No evidence of intra or extra-axial hemorrhage. No evidence of masses or mass effects or shifts. Ventricular system is normal and symmetrical.  The skull paranasal sinuses and orbits appear unremarkable. No evidence of pulmonary embolism or acute pulmonary abnormality. Minimal atelectatic changes. No acute intracranial abnormality. CTA HEAD NECK W CONTRAST    Result Date: 4/25/2022  EXAMINATION: CTA OF THE HEAD AND NECK WITH CONTRAST 4/25/2022 9:09 am: TECHNIQUE: CTA of the head and neck was performed with the administration of intravenous contrast. Multiplanar reformatted images are provided for review. MIP images are provided for review. Stenosis of the internal carotid arteries measured using NASCET criteria.  Dose modulation, iterative reconstruction, and/or weight based adjustment of the mA/kV was utilized to reduce the radiation dose to as low as reasonably achievable. COMPARISON: CT brain performed 04/25/2022. HISTORY: ORDERING SYSTEM PROVIDED HISTORY: headache, dizziness improving TECHNOLOGIST PROVIDED HISTORY: Reason for exam:->headache, dizziness improving Has a \"code stroke\" or \"stroke alert\" been called? ->No Decision Support Exception - unselect if not a suspected or confirmed emergency medical condition->Emergency Medical Condition (MA) Reason for Exam: headache, dizziness improving Additional signs and symptoms: NONE Relevant Medical/Surgical History: 75ML ISOVUE 370/ GFR>60 FINDINGS: CTA NECK: AORTIC ARCH/ARCH VESSELS: No dissection or arterial injury. No significant stenosis of the brachiocephalic or subclavian arteries. CAROTID ARTERIES: No dissection, arterial injury, or hemodynamically significant stenosis by NASCET criteria. VERTEBRAL ARTERIES: No dissection, arterial injury, or significant stenosis. SOFT TISSUES: The lung apices are clear. No cervical or superior mediastinal lymphadenopathy. The larynx and pharynx are unremarkable. No acute abnormality of the salivary and thyroid glands. BONES: No acute osseous abnormality. CTA HEAD: ANTERIOR CIRCULATION: No significant stenosis of the intracranial internal carotid, anterior cerebral, or middle cerebral arteries. No aneurysm. POSTERIOR CIRCULATION: No significant stenosis of the vertebral, basilar, or posterior cerebral arteries. No aneurysm. OTHER: No dural venous sinus thrombosis on this non-dedicated study. BRAIN: No mass effect or midline shift. No extra-axial fluid collection. The gray-white differentiation is maintained. Unremarkable CTA of the head and neck.      MRI BRAIN WO CONTRAST    Result Date: 4/26/2022  EXAMINATION: MRI OF THE BRAIN WITHOUT CONTRAST  4/26/2022 1:34 pm TECHNIQUE: Multiplanar multisequence MRI of the brain was performed without the administration of intravenous contrast. COMPARISON: None. HISTORY: ORDERING SYSTEM PROVIDED HISTORY: dizziness, chest pain, headache, r/o CVA TECHNOLOGIST PROVIDED HISTORY: Reason for exam:->dizziness, chest pain, headache, r/o CVA What is the sedation requirement?->None Decision Support Exception - unselect if not a suspected or confirmed emergency medical condition->Emergency Medical Condition (MA) Reason for Exam: dizziness, chest pain, headache, r/o CVA Initial evaluation. FINDINGS: INTRACRANIAL STRUCTURES/VENTRICLES: There is no acute infarct. No mass effect or midline shift. No evidence of an acute intracranial hemorrhage. Areas of T2 FLAIR hyperintensity are seen in the periventricular and subcortical white matter, which are nonspecific, but may represent chronic microvascular ischemic change. There is minimal global parenchymal volume loss. Otherwise, the ventricles and sulci are normal in size and configuration. A partial empty sella is noted. The normal signal voids within the major intracranial vessels appear maintained. ORBITS: The visualized portion of the orbits demonstrate no acute abnormality. SINUSES: The visualized paranasal sinuses and mastoid air cells demonstrate no acute abnormality. BONES/SOFT TISSUES: The bone marrow signal intensity appears normal. The soft tissues demonstrate no acute abnormality. 1. No acute intracranial abnormality. No acute infarct. 2. Minimal global parenchymal volume loss with mild chronic microvascular ischemic changes.      NM MYOCARDIAL SPECT REST EXERCISE OR RX    Result Date: 4/26/2022  Cardiac Perfusion Imaging   Demographics   Patient Name      Gayle Garcias       Date of study        04/26/2022   Date of Birth     1955         Gender               Female   Age               74 year(s)         Race                    Patient Number    [id number]         Room Number          7383   Visit Number      [id number]          Height               66 inches   Corporate ID      [id number] Weight               185 pounds   Accession Number  [id number]                                        NM Technologist      Pieter Sun, [address]  Physician         Lindsay Mei         Cardiologist         MD   Conclusions   Summary  Normal EF 68 % with normal ventricular contractility. No infarct or ischemia  noted. Apical artifact due to breast artifact  Normal stress test  ECG portion of stress test is negative for ischemia by diagnostic criteria. Signatures   ------------------------------------------------------------------  Electronically signed by Moshe Gonzalez MD (Interpreting  cardiologist) on 04/26/2022 at 13:07  ------------------------------------------------------------------  Procedure Procedure Type:   Nuclear Stress Test:Pharmacological, Myocardial Perfusion Imaging with  Pharm, NM MYOCARDIAL SPECT REST EXERCISE OR RX  Indications: Chest pain. Risk Factors   The patient risk factors include:hypercholesterolemia and hypertension. Stress Protocols   Resting ECG  Normal sinus rhythm. Resting HR:90 bpm  Resting BP:162/86 mmHg  Stress Protocol:Pharmacologic - Lexiscan  Peak HR:101 bpm                              HR/BP product:[id number]  Peak BP:165/88 mmHg  Predicted HR: 152 bpm  % of predicted HR: 66   Exercise duration: 01:08 min  Reason for termination:Completed   ECG Findings  Normal sinus rhythm. Symptoms  No symptoms with Lexiscan infusion. Stress Interpretation  ECG portion of stress test is negative for ischemia by diagnostic criteria. Procedure Medications   - Lexiscan I.V. bolus (over 15sec.) 0.4 mg admininstered @ 04/26/2022 09:40. Imaging Protocols   Rest                             Stress   Isotope:Sestamibi 99mTc          Isotope: Sestamibi 99mTc  Isotope dose:10.6 mCi            Isotope dose:31.2 mCi  Administration route: I.V. Administration route: I.V.   Injection Date:04/26/2022 08:05 Injection Date:04/26/2022 09:40  Scan Date:04/26/2022 08:50       Scan Date:04/26/2022 10:25   Technique:        SPECT          Technique:        Gated                                                     SPECT   Procedure Description   Upon patient arrival, the patient is identified using two identifiers and  the physician order is verified. An IV is established and 8-11mCi of 99mTc  Sestamibi is intravenously injected and followed with 10mL 0.9% Normal  Saline flush. A circulation period of 45 minutes occurs prior to resting  SPECT imaging. After imaging is complete the patient is escorted to the  stress lab. The patient is connected to the ECG and blood pressure is  measured. The RN starts the stress portion of the exam and rapidly  intravenously injects Lexiscan (regadenosine) 0.4mg over a period of 10 to15  seconds and follows with 5mL 0.9% Normal Saline flush. Immediately following  the Nuclear Technologist intravenously injects 22-33mCi of 99mTc Sestamibi  and 5mL 0.9% Normal Saline flush. After completion, recovery, and removal of  the IV, the patient rests during the second circulation period of 45  minutes. Final stress SPECT gated imaging is performed. The patient may  return home or to their room after stress imaging. The images are processed  and final charting is completed and sent to the appropriate cardiologist for  interpretation and reporting. Perfusion Interpretation   Normal EF 68 % with normal ventricular contractility. No infarct or ischemia  noted. Apical artifact due to breast artifact  Imaging Results    Summed scores     - Summed stress score: 1     - Summed rest score: 0     - Summed difference score:    1   Rest ejection  Ejection fraction:68 %  EDV :66 ml  ESV :21 ml  Stroke volume :45 ml  Medical History   Accession#:  [id number]  Admission Data Admission date: 04/25/2022 Admission Time: 06:56 Hospital Status: Outpatient.       CBC:   Recent Labs     04/25/22  0646 04/26/22  0247 04/27/22  0711   WBC 6.8 6.4 5.4   HGB 15.4 14.6 15.5    207 195     BMP:    Recent Labs     04/25/22  0646 04/26/22  0247 04/27/22  0711    141 140   K 3.5 3.3* 3.6    100 99   CO2 23 28 31   BUN 10 10 12   CREATININE 0.7 0.7 0.7   GLUCOSE 108* 109* 117*     Hepatic:   Recent Labs     04/25/22  0646 04/26/22  0247 04/27/22  0711   AST 21 17 20   ALT 24 21 25   BILITOT 0.4 0.5 0.7   ALKPHOS 113 103 112     Lipids:   Lab Results   Component Value Date    CHOL 197 04/26/2022    HDL 63 04/26/2022    TRIG 129 04/26/2022     Hemoglobin A1C:   Lab Results   Component Value Date    LABA1C 5.3 04/26/2022     TSH: No results found for: TSH  Troponin:   Lab Results   Component Value Date    TROPONINT <0.010 04/25/2022    TROPONINT <0.010 04/25/2022    TROPONINT <0.010 04/25/2022     Lactic Acid: No results for input(s): LACTA in the last 72 hours.   BNP:   Recent Labs     04/25/22  0646   PROBNP 57.30     UA:  Lab Results   Component Value Date    NITRU NEGATIVE 04/25/2022    COLORU YELLOW 04/25/2022    WBCUA <1 04/25/2022    RBCUA <1 04/25/2022    MUCUS NEGATIVE 11/14/2010    TRICHOMONAS NONE SEEN 04/25/2022    BACTERIA NEGATIVE 04/25/2022    CLARITYU CLEAR 04/25/2022    SPECGRAV <1.005 04/25/2022    LEUKOCYTESUR NEGATIVE 04/25/2022    UROBILINOGEN 0.2 04/25/2022    BILIRUBINUR NEGATIVE 04/25/2022    BLOODU NEGATIVE 04/25/2022    KETUA NEGATIVE 04/25/2022     Urine Cultures: No results found for: LABURIN  Blood Cultures: No results found for: BC  No results found for: BLOODCULT2  Organism: No results found for: ORG    Time Spent Discharging patient 32 minutes    [facility]  4/27/2022, 9:39 AM

## 2022-04-27 NOTE — PROGRESS NOTES
Reviewed discharge instructions with patient. Discussed medications, follow-up care, reasons to return, and care for diagnosis at home. Pt verbalized understanding and stated no further questions. Pt AOx4 at discharge and has no LDA. Radha ZAYAS notified pt BP and states pt can be discharged. Pt received meds to beds. Pt has all belongings at discharge including clothes, shoes, purse, phone, flower, paperwork and written script for labs. Pt ambulated with VIVIANE Salazar to ride.  Pt ambulated with steady gait and driving self home per approval MARQUEZ PIMENTEL

## 2022-04-27 NOTE — PROGRESS NOTES
Outpatient Pharmacy Progress Note for Meds-to-Beds    Total number of Prescriptions Filled: 3  The following medications were dispensed to the patient during the discharge process:   Atorvastatin 40mg   Carvedilol 12.5mg   Hydrochlorothiazide 25mg    Additional Documentation:   Medication(s) were delivered to the patient's room prior to discharge      Thank you for letting us serve your patients.   [facility]    [address], [address] [facility]    Phone: [phone]    Fax: [phone]

## 2022-05-18 ENCOUNTER — HOSPITAL ENCOUNTER (EMERGENCY)
Age: 67
Discharge: HOME | End: 2022-05-18
Attending: EMERGENCY MEDICINE
Payer: COMMERCIAL

## 2022-05-18 VITALS
BODY MASS INDEX: 30.53 KG/M2 | HEART RATE: 110 BPM | DIASTOLIC BLOOD PRESSURE: 98 MMHG | WEIGHT: 190 LBS | RESPIRATION RATE: 18 BRPM | HEIGHT: 66 IN | SYSTOLIC BLOOD PRESSURE: 171 MMHG | TEMPERATURE: 97.8 F | OXYGEN SATURATION: 95 %

## 2022-05-18 DIAGNOSIS — I10 HYPERTENSION, UNSPECIFIED TYPE: Primary | ICD-10-CM

## 2022-05-18 PROCEDURE — 99282 EMERGENCY DEPT VISIT SF MDM: CPT

## 2022-05-18 ASSESSMENT — PAIN - FUNCTIONAL ASSESSMENT: PAIN_FUNCTIONAL_ASSESSMENT: NONE - DENIES PAIN

## 2022-05-18 NOTE — ED PROVIDER NOTES
Emergency Department Encounter  [address]    Patient: Phong Murray  MRN: [id number]  : 1955  Date of Evaluation: 2022  ED Provider: Bakari Gore MD    Chief Complaint       Chief Complaint   Patient presents with    Hypertension     Francois Osborn is a 74 y.o. female who presents to the emergency department for evaluation of elevated blood pressure. Patient reports been usual state of health until approximately 3 weeks ago. She woke up in the middle of the night with chest pressure and paresthesias in the right side of her face. She she says she was seen and evaluated in the emergency department. Admitted to the hospital and did have MRIs stress test echo laboratory work and cardiac evaluation. He does report that everything came back within normal limits but she was diagnosed with hypertension and placed on hydrochlorothiazide and Coreg. She has been taking Coreg 12.5 mg in the morning and the evening she had been on hydrochlorothiazide but she did stop taking that when her potassium became too low. She reports that she has not had any symptoms after being discharged from the hospital but she does report that her blood pressure has remained persistently mildly elevated although a lot lower than it was when she first went to the hospital.  Currently denies any chest pain headache shortness of breath weakness syncope or any complaints. But she was concerned about her blood pressure being elevated so she wanted to discuss this with her physician. She was supposed to have a follow-up appointment with her cardiologist yesterday but unfortunately it got postponed until 31 May. Patient remains asymptomatic    ROS:     At least 10 systems reviewed and otherwise acutely negative except as in the [address]     Past History     Past Medical History:   Diagnosis Date    Allergic rhinitis     Hypokalemia 2022    Obesity     Primary hypertension 2022     Past Surgical History:   Procedure Laterality Date    HYSTERECTOMY, TOTAL ABDOMINAL      TONSILLECTOMY       Social History     Socioeconomic History    Marital status:      Spouse name: None    Number of children: None    Years of education: None    Highest education level: None   Occupational History    None   Tobacco Use    Smoking status: Former Smoker     Packs/day: 0.50     Years: 20.00     Pack years: 10.00     Types: Cigarettes     Quit date: 6/10/1991     Years since quittin.9    Smokeless tobacco: Never Used   Substance and Sexual Activity    Alcohol use: No    Drug use: No    Sexual activity: None   Other Topics Concern    None   Social History Narrative    None     Social Determinants of Health     Financial Resource Strain:     Difficulty of Paying Living Expenses: Not on file   Food Insecurity:     Worried About Running Out of Food in the Last Year: Not on file    Jacky of Food in the Last Year: Not on file   Transportation Needs:     Lack of Transportation (Medical): Not on file    Lack of Transportation (Non-Medical):  Not on file   Physical Activity:     Days of Exercise per Week: Not on file    Minutes of Exercise per Session: Not on file   Stress:     Feeling of Stress : Not on file   Social Connections:     Frequency of Communication with Friends and Family: Not on file    Frequency of Social Gatherings with Friends and Family: Not on file    Attends Tenriism Services: Not on file    Active Member of Clubs or Organizations: Not on file    Attends Club or Organization Meetings: Not on file    Marital Status: Not on file   Intimate Partner Violence:     Fear of Current or Ex-Partner: Not on file    Emotionally Abused: Not on file    Physically Abused: Not on file    Sexually Abused: Not on file   Housing Stability:     Unable to Pay for Housing in the Last Year: Not on file    Number of [place] in the Last Year: Not on file    Unstable Housing in the Last Year: Not on file       Medications/Allergies     Previous Medications    ATORVASTATIN (LIPITOR) 40 MG TABLET    Take 1 tablet by mouth nightly    CARVEDILOL (COREG) 12.5 MG TABLET    Take 1 tablet by mouth 2 times daily (with meals)    HYDROCHLOROTHIAZIDE (HYDRODIURIL) 25 MG TABLET    Take 1 tablet by mouth daily     Allergies   Allergen Reactions    Pcn [Penicillins] Swelling and Rash    Sulfa Antibiotics Rash        Physical Exam       ED Triage Vitals   BP Temp Temp Source Pulse Resp SpO2 Height Weight   05/18/22 1725 05/18/22 1725 05/18/22 1725 05/18/22 1725 05/18/22 1725 05/18/22 1725 05/18/22 1723 05/18/22 1723   (!) 171/98 97.8 °F (36.6 °C) Infrared 110 18 95 % 5' 6\" (1.676 m) 190 lb (86.2 kg)     GENERAL APPEARANCE: Awake and alert. Cooperative. No acute distress. HEAD: Normocephalic. Atraumatic. EYES: Sclera anicteric. Pupils equal round reactive to light extraocular movements are intact  ENT: Tolerates saliva. No trismus. Moist mucous membranes  NECK: Supple. Trachea midline. No meningismus  CARDIO: RRR. Radial pulse 2+. No murmurs rubs or gallops appreciated  LUNGS: Respirations unlabored. CTAB. No accessory muscle usage noted. No wheezes rales rhonchi or stridor. ABDOMEN: Soft. Non-distended. Non-tender. No tenderness in right upper quadrant or right lower quadrant to deep palpation  EXTREMITIES: No acute deformities. No unilateral leg swelling or tenderness behind either one of calves  SKIN: Warm and dry. No erythema edema or rashes appreciated  NEUROLOGICAL:  Cranial nerves II through XII grossly intact. No gross facial drooping. Moves all 4 extremities spontaneously. PSYCHIATRIC: Normal mood. Alert and oriented x3. No reported active suicidality or homicidality. Diagnostics   Labs:  No results found for this visit on 05/18/22. Radiographs:  No results found.     Procedures/EKG:       ED Course and MDM   In brief, Trace Brito is a 74 y.o. female who presented to the emergency department for evaluation of asymptomatic hypertension. Based on patient's history and physical do believe the patient needs any imaging studies or laboratory work at this time. She has had a cardiac evaluation within the past 3 weeks is currently on blood pressure medicine and is asymptomatic. Did have long discussion with the patient about methods of obtaining good blood pressure measurements. Also discussed taking an extra dose of her antihypertensives at home if she becomes concerned about her blood pressure or develops any symptoms. Recommend close follow-up with primary care physician or referral physician next 24 to 48 hours return to the emergency department for chest pain, shortness of breath, syncope, weakness, any other concerning symptoms she did express a verbal understanding I attempted to answer all of her questions best my ability    ED Medication Orders (From admission, onward)    None          Final Impression      1.  Hypertension, unspecified type      DISPOSITION Decision To Discharge 05/18/2022 05:51:55 PM         (Please note that portions of this note may have been completed with a voice recognition program. Efforts were made to edit the dictations but occasionally words are mis-transcribed.)    Khoa Brown MD  [address] [place] [address] MD Marvin  05/18/22 6101

## 2022-05-18 NOTE — ED NOTES
Discharge instructions given per OBSTON Villasenor RN. Pt to exit per self, without incident.       Ry Michel RN  05/18/22 5448

## 2022-05-18 NOTE — Clinical Note
Lotus Mejia was seen and treated in our emergency department on 5/18/2022. She may return to work on 05/20/2022. If you have any questions or concerns, please don't hesitate to call.       Navi Pitts MD

## 2022-05-18 NOTE — ED NOTES
Reports started taking medication Coreg 3 weeks ago. Reports is making her feel dizzy and tired at times.      Cate Yeung, RN  05/18/22 5833

## 2022-05-18 NOTE — Clinical Note
Radha Rojo was seen and treated in our emergency department on 5/18/2022. She may return to work on 05/20/2022. If you have any questions or concerns, please don't hesitate to call.       Tonja Martinez MD

## 2022-11-11 ENCOUNTER — APPOINTMENT (OUTPATIENT)
Dept: GENERAL RADIOLOGY | Age: 67
End: 2022-11-11

## 2022-11-11 ENCOUNTER — HOSPITAL ENCOUNTER (EMERGENCY)
Age: 67
Discharge: HOME | End: 2022-11-11
Attending: EMERGENCY MEDICINE

## 2022-11-11 VITALS
WEIGHT: 195 LBS | TEMPERATURE: 97.7 F | HEART RATE: 88 BPM | OXYGEN SATURATION: 96 % | SYSTOLIC BLOOD PRESSURE: 170 MMHG | RESPIRATION RATE: 18 BRPM | DIASTOLIC BLOOD PRESSURE: 105 MMHG | HEIGHT: 66 IN | BODY MASS INDEX: 31.34 KG/M2

## 2022-11-11 DIAGNOSIS — M25.462 EFFUSION OF LEFT KNEE JOINT: ICD-10-CM

## 2022-11-11 DIAGNOSIS — M25.562 ACUTE PAIN OF LEFT KNEE: Primary | ICD-10-CM

## 2022-11-11 DIAGNOSIS — I10 ESSENTIAL HYPERTENSION: ICD-10-CM

## 2022-11-11 PROCEDURE — 73562 X-RAY EXAM OF KNEE 3: CPT

## 2022-11-11 PROCEDURE — 99283 EMERGENCY DEPT VISIT LOW MDM: CPT

## 2022-11-11 PROCEDURE — 6360000002 HC RX W HCPCS: Performed by: EMERGENCY MEDICINE

## 2022-11-11 RX ORDER — HYDROCODONE BITARTRATE AND ACETAMINOPHEN 5; 325 MG/1; MG/1
1 TABLET ORAL EVERY 6 HOURS PRN
Qty: 12 TABLET | Refills: 0 | Status: SHIPPED | OUTPATIENT
Start: 2022-11-11 | End: 2022-11-14

## 2022-11-11 RX ORDER — PREDNISONE 20 MG/1
20 TABLET ORAL 2 TIMES DAILY
Qty: 10 TABLET | Refills: 0 | Status: SHIPPED | OUTPATIENT
Start: 2022-11-11 | End: 2022-11-16

## 2022-11-11 RX ADMIN — Medication 10 MG: at 14:04

## 2022-11-11 NOTE — ED PROVIDER NOTES
Emergency Department Encounter    Patient: Adalberto Nichols  MRN: [id number]  : 1955  Date of Evaluation: 2022  ED Provider:  Koko Durán DO    Triage Chief Complaint:   Knee Pain and Leg Pain (Left knee and leg pain since . No known injury.)    [place]:  Adalberto Nichols is a 69 y.o. female that presents to the emergency department complaining of left knee pain that started this past  5 days ago. Patient states no falls injuries trauma no heavy lifting pulling or straining. She states no previous injuries or surgeries to the left knee. She states she has been taking naproxen and Tylenol with minimal relief. She states pain worse when walking up and down steps, pain worse when standing up sitting for a long time. Patient states never happened before. She denies any bleeding cuts laceration pus drainage discharge to the left knee. States most of the pain in the medial aspect with some swelling. States no redness. She states no diagnosed arthritis in the past.  Patient here for evaluation. Patient did drive herself to the emergency department today.     ROS - see HPI, below listed is current ROS at time of my eval:  General:  No fevers, no chills, no weakness  Eyes:  No recent vison changes, no discharge  ENT:  No sore throat, no nasal congestion, no hearing changes  Cardiovascular:  No chest pain, no palpitations  Respiratory:  No shortness of breath, no cough, no wheezing  Gastrointestinal:  No pain, no nausea, no vomiting, no diarrhea  Musculoskeletal:  No muscle pain, no joint pain  Skin:  No rash, no pruritis, no easy bruising  Neurologic:  No speech problems, no headache, no extremity numbness, no extremity tingling, no extremity weakness  Psychiatric:  No anxiety  Genitourinary:  No dysuria, no hematuria  Endocrine:  No unexpected weight gain, no unexpected weight loss  Extremities: Positive for left knee pain and swelling    Past Medical History:   Diagnosis Date    Allergic rhinitis     Hypokalemia 2022    Obesity     Primary hypertension 2022     Past Surgical History:   Procedure Laterality Date    HYSTERECTOMY, TOTAL ABDOMINAL (CERVIX REMOVED)      TONSILLECTOMY       Family History   Problem Relation Age of Onset    Cancer Other         aunt    Diabetes Mother     Hypertension Mother     Heart Disease Father     Heart Disease Brother     Heart Disease Other         grandmother    Hypertension Brother     Hypertension Son     Other Brother         chronic lung disease    Other Son         chronic lung disease    Elevated Lipids Brother     Elevated Lipids Son      Social History     Socioeconomic History    Marital status:      Spouse name: Not on file    Number of children: Not on file    Years of education: Not on file    Highest education level: Not on file   Occupational History    Not on file   Tobacco Use    Smoking status: Former     Packs/day: 0.50     Years: 20.00     Pack years: 10.00     Types: Cigarettes     Quit date: 6/10/1991     Years since quittin.4    Smokeless tobacco: Never   Substance and Sexual Activity    Alcohol use: No    Drug use: No    Sexual activity: Not on file   Other Topics Concern    Not on file   Social History Narrative    Not on file     Social Determinants of Health     Financial Resource Strain: Not on file   Food Insecurity: Not on file   Transportation Needs: Not on file   Physical Activity: Not on file   Stress: Not on file   Social Connections: Not on file   Intimate Partner Violence: Not on file   Housing Stability: Not on file     Current Facility-Administered Medications   Medication Dose Route Frequency Provider Last Rate Last Admin    dexamethasone (DECADRON) 1 MG/ML solution 10 mg  10 mg Oral Once Gracie Perez, DO         Current Outpatient Medications   Medication Sig Dispense Refill    carvedilol (COREG) 12.5 MG tablet Take 1 tablet by mouth 2 times daily (with meals) 60 tablet 3    hydroCHLOROthiazide (HYDRODIURIL) 25 MG tablet Take 1 tablet by mouth daily 30 tablet 3    atorvastatin (LIPITOR) 40 MG tablet Take 1 tablet by mouth nightly 30 tablet 3     Allergies   Allergen Reactions    Pcn [Penicillins] Swelling and Rash    Sulfa Antibiotics Rash       Nursing Notes Reviewed    Physical Exam:  Triage VS:    ED Triage Vitals [11/11/22 1339]   Enc Vitals Group      BP (!) 168/105      Heart Rate (!) 102      Resp 18      Temp 97.7 °F (36.5 °C)      Temp Source Infrared      SpO2 96 %      Weight 195 lb (88.5 kg)      Height 5' 6\" (1.676 m)      Head Circumference       Peak Flow       Pain Score       Pain Loc       Pain Edu? Excl. in [address]? BP (!) 170/105   Pulse 88   Temp 97.7 °F (36.5 °C) (Infrared)   Resp 18   Ht 5' 6\" (1.676 m)   Wt 195 lb (88.5 kg)   SpO2 96%   BMI 31.47 kg/m²       My pulse ox interpretation is - normal    General appearance:  No acute distress. Skin:  Warm. Dry. Eye:  Extraocular movements intact. Ears, nose, mouth and throat:  Oral mucosa moist   Neck:  Trachea midline. Extremity: Left knee with pain to range of motion but normal range of motion, swelling noted to the medial aspect of the left knee, no ligament laxity, patient is ambulatory with a limp, intact distal pulses left lower extremity, intact capillary refill each of the digits of the left foot, no pus drainage discharge warmth redness cellulitis to the left lower extremity and knee, normal ankle dorsiflexion and plantar flexion at the left ankle, normal ROM     Heart:  Regular rate and rhythm, normal S1 & S2, no extra heart sounds. Perfusion:  intact  Respiratory:  Lungs clear to auscultation bilaterally. Respirations nonlabored. Abdominal:  Normal bowel sounds. Soft. Nontender. Non distended. Back:  No CVA tenderness to palpation     Neurological:  Alert and oriented times 3. No focal neuro deficits.              Psychiatric:  Appropriate    I have reviewed and interpreted all of the currently available lab results from this visit (if applicable):  No results found for this visit on 11/11/22. Radiographs (if obtained):  Radiologist's Report Reviewed:  XR KNEE LEFT (3 VIEWS)   Final Result   No acute osseous abnormality of the knee. Small effusion. EKG (if obtained): (All EKG's are interpreted by myself in the absence of a cardiologist)    Medications   dexamethasone (DECADRON) 1 MG/ML solution 10 mg (10 mg Oral Given 11/11/22 1404)       MDM:  Patient presents to the emergency department complaint left knee pain. On physical exam patient does have some mild swelling to the medial aspect of the left knee, patient does still have full range of motion to the left knee she is tender palpation with a range motion, no obvious deformity of the left knee. X-ray was ordered as well as Decadron. Patient hypertensive. Patient states her blood pressure elevated because of the pain. Patient states minimal relief with a steroid given earlier. X-ray shows small joint effusion of the left knee but no acute fracture dislocation or subluxation. Patient will placed on Norco and prednisone for pain and swelling and Ace wrap. Patient to follow-up with orthopedist Dr. Pura Garcai in 3 days for reevaluation. Patient follow-up primary care physician as needed in 4 days for reevaluation. Patient is to return to the emergency department immediately for worsening symptoms. All questions answered. Clinical Impression:  1. Acute pain of left knee    2. Effusion of left knee joint    3. Essential hypertension      Disposition referral (if applicable):  Doc Monteiro MD  [address]  [phone]    Schedule an appointment as soon as possible for a visit in 3 days  If symptoms worsen and for re-evaluation.  Call for an appointment    Rdau Mann, [address]  [phone]    Schedule an appointment as soon as possible for a visit in 3 days  For evaluation of left knee pain and small joint effusion. Call for an appointment. [facility] [address] [address]  [phone]  Go in 1 day  If symptoms worsen  Disposition medications (if applicable):  New Prescriptions    HYDROCODONE-ACETAMINOPHEN (NORCO) 5-325 MG PER TABLET    Take 1 tablet by mouth every 6 hours as needed for Pain for up to 3 days. Intended supply: 3 days. Take lowest dose possible to manage pain    PREDNISONE (DELTASONE) 20 MG TABLET    Take 1 tablet by mouth 2 times daily for 5 days     ED Provider Disposition Time  DISPOSITION  2:48 PM        Comment: Please note this report has been produced using speech recognition software and may contain errors related to that system including errors in grammar, punctuation, and spelling, as well as words and phrases that may be inappropriate. Efforts were made to edit the dictations.         Katherin Valles DO  11/11/22 1447

## 2022-11-11 NOTE — DISCHARGE INSTRUCTIONS
Follow-up with orthopedic Dr. Zelia Sandifer 3 days for reevaluation of left knee pain and small knee effusion. Call for an appointment. Follow-up with primary care physician as needed in 3 to 4 days. Continue Ace wrap to the left knee for compression decrease swelling  Rest, ice, elevation compression to the left knee  Take Norco as needed for knee pain. No drink or drive while taking. Take prednisone as prescribed and directed. Return to the emergency department immediately increased pain swelling numbness and tingling weakness extremities inability ambulate or worsening symptoms.

## 2022-12-22 ENCOUNTER — HOSPITAL ENCOUNTER (EMERGENCY)
Age: 67
Discharge: HOME | End: 2022-12-22
Attending: EMERGENCY MEDICINE

## 2022-12-22 ENCOUNTER — APPOINTMENT (OUTPATIENT)
Dept: GENERAL RADIOLOGY | Age: 67
End: 2022-12-22

## 2022-12-22 VITALS
WEIGHT: 190 LBS | HEART RATE: 106 BPM | OXYGEN SATURATION: 96 % | DIASTOLIC BLOOD PRESSURE: 104 MMHG | SYSTOLIC BLOOD PRESSURE: 180 MMHG | TEMPERATURE: 98 F | HEIGHT: 66 IN | RESPIRATION RATE: 18 BRPM | BODY MASS INDEX: 30.53 KG/M2

## 2022-12-22 DIAGNOSIS — J06.9 UPPER RESPIRATORY TRACT INFECTION DUE TO COVID-19 VIRUS: ICD-10-CM

## 2022-12-22 DIAGNOSIS — U07.1 UPPER RESPIRATORY TRACT INFECTION DUE TO COVID-19 VIRUS: ICD-10-CM

## 2022-12-22 DIAGNOSIS — U07.1 COVID-19: ICD-10-CM

## 2022-12-22 DIAGNOSIS — Z71.89 EDUCATED ABOUT COVID-19 VIRUS INFECTION: Primary | ICD-10-CM

## 2022-12-22 LAB
RAPID INFLUENZA  B AGN: NEGATIVE
RAPID INFLUENZA A AGN: NEGATIVE
SARS-COV-2, NAAT: DETECTED
SOURCE: ABNORMAL

## 2022-12-22 PROCEDURE — 96372 THER/PROPH/DIAG INJ SC/IM: CPT

## 2022-12-22 PROCEDURE — 6370000000 HC RX 637 (ALT 250 FOR IP): Performed by: EMERGENCY MEDICINE

## 2022-12-22 PROCEDURE — 99284 EMERGENCY DEPT VISIT MOD MDM: CPT

## 2022-12-22 PROCEDURE — 87635 SARS-COV-2 COVID-19 AMP PRB: CPT

## 2022-12-22 PROCEDURE — 71046 X-RAY EXAM CHEST 2 VIEWS: CPT

## 2022-12-22 PROCEDURE — 87804 INFLUENZA ASSAY W/OPTIC: CPT

## 2022-12-22 PROCEDURE — 6360000002 HC RX W HCPCS: Performed by: EMERGENCY MEDICINE

## 2022-12-22 RX ORDER — GUAIFENESIN 600 MG/1
600 TABLET, EXTENDED RELEASE ORAL 2 TIMES DAILY
Qty: 20 TABLET | Refills: 0 | Status: SHIPPED | OUTPATIENT
Start: 2022-12-22 | End: 2023-01-01

## 2022-12-22 RX ORDER — KETOROLAC TROMETHAMINE 30 MG/ML
30 INJECTION, SOLUTION INTRAMUSCULAR; INTRAVENOUS ONCE
Status: COMPLETED | OUTPATIENT
Start: 2022-12-22 | End: 2022-12-22

## 2022-12-22 RX ORDER — GUAIFENESIN 600 MG/1
600 TABLET, EXTENDED RELEASE ORAL 2 TIMES DAILY
Status: DISCONTINUED | OUTPATIENT
Start: 2022-12-22 | End: 2022-12-22 | Stop reason: HOSPADM

## 2022-12-22 RX ORDER — DEXTROMETHORPHAN HYDROBROMIDE, GUAIFENESIN 5; 100 MG/5ML; MG/5ML
20 LIQUID ORAL EVERY 4 HOURS PRN
Qty: 118 ML | Refills: 0 | Status: SHIPPED | OUTPATIENT
Start: 2022-12-22 | End: 2022-12-29

## 2022-12-22 RX ADMIN — KETOROLAC TROMETHAMINE 30 MG: 30 INJECTION, SOLUTION INTRAMUSCULAR; INTRAVENOUS at 12:06

## 2022-12-22 RX ADMIN — GUAIFENESIN 600 MG: 600 TABLET, EXTENDED RELEASE ORAL at 12:06

## 2022-12-22 ASSESSMENT — PAIN - FUNCTIONAL ASSESSMENT: PAIN_FUNCTIONAL_ASSESSMENT: 0-10

## 2022-12-22 ASSESSMENT — PAIN SCALES - GENERAL
PAINLEVEL_OUTOF10: 3
PAINLEVEL_OUTOF10: 3

## 2022-12-22 NOTE — Clinical Note
Mina Parnell was seen and treated in our emergency department on 12/22/2022. She may return to work on 12/26/2022. If you have any questions or concerns, please don't hesitate to call.       Adriane Becker MD

## 2022-12-22 NOTE — Clinical Note
Carlos Alberto Goldsmith was seen and treated in our emergency department on 12/22/2022. She may return to work on 12/26/2022. If you have any questions or concerns, please don't hesitate to call.       Shahab Benoit MD

## 2022-12-22 NOTE — ED PROVIDER NOTES
[address] [facility] ENCOUNTER      Pt Name: Emily Aguirre  MRN: [id number]  [place] 1955  Date of evaluation: 12/22/2022  Provider: Adriane Becker MD  Insurance:  Payor: /   Current Code Status   Code Status: Prior     CHIEF COMPLAINT       Chief Complaint   Patient presents with    Cough    Congestion         HISTORY OF PRESENT ILLNESS    HPI    Nursing Notes were reviewed. This is a 69 y.o. female who presents to the emergency department with upper respiratory and flulike symptoms. Patient describes generalized malaise, nonproductive cough, upper respiratory congestion, and body aches for the past 4 to 5 days. The patient says she is in the emergency department because she has not gotten better with her treatment with [place] plus. She denies substernal chest pain or difficulty breathing. Denies nausea or vomiting. Denies abdominal pain. Denies dysuria or frequency. REVIEW OF SYSTEMS       Review of Systems    10 Systems were reviewed with this patient and all were negative with exception of those noted in the history of present illness above.       PAST MEDICAL HISTORY     Past Medical History:   Diagnosis Date    Allergic rhinitis     Hypokalemia 4/27/2022    Obesity     Primary hypertension 4/25/2022         SURGICAL HISTORY       Past Surgical History:   Procedure Laterality Date    HYSTERECTOMY, TOTAL ABDOMINAL (CERVIX REMOVED)      TONSILLECTOMY           CURRENT MEDICATIONS       Previous Medications    ATORVASTATIN (LIPITOR) 40 MG TABLET    Take 1 tablet by mouth nightly    CARVEDILOL (COREG) 12.5 MG TABLET    Take 1 tablet by mouth 2 times daily (with meals)    HYDROCHLOROTHIAZIDE (HYDRODIURIL) 25 MG TABLET    Take 1 tablet by mouth daily       ALLERGIES     Pcn [penicillins] and Sulfa antibiotics    FAMILY HISTORY       Family History   Problem Relation Age of Onset    Cancer Other         aunt    Diabetes Mother     Hypertension Mother Heart Disease Father     Heart Disease Brother     Heart Disease Other         grandmother    Hypertension Brother     Hypertension Son     Other Brother         chronic lung disease    Other Son         chronic lung disease    Elevated Lipids Brother     Elevated Lipids Son           SOCIAL HISTORY       Social History     Socioeconomic History    Marital status:      Spouse name: None    Number of children: None    Years of education: None    Highest education level: None   Tobacco Use    Smoking status: Former     Packs/day: 0.50     Years: 20.00     Pack years: 10.00     Types: Cigarettes     Quit date: 6/10/1991     Years since quittin.5    Smokeless tobacco: Never   Substance and Sexual Activity    Alcohol use: No    Drug use: No       PHYSICAL EXAM       ED Triage Vitals [22 1158]   BP Temp Temp src Heart Rate Resp SpO2 Height Weight   -- 98 °F (36.7 °C) -- (!) 106 18 96 % 5' 6\" (1.676 m) 190 lb (86.2 kg)       Physical Exam  Vitals and nursing note reviewed. Constitutional:       General: She is not in acute distress. Appearance: Normal appearance. She is obese. She is not ill-appearing, toxic-appearing or diaphoretic. HENT:      Head: Normocephalic and atraumatic. Nose: Congestion and rhinorrhea present. Mouth/Throat:      Mouth: Mucous membranes are moist.   Eyes:      Conjunctiva/sclera: Conjunctivae normal.   Cardiovascular:      Rate and Rhythm: Normal rate and regular rhythm. Pulmonary:      Effort: Pulmonary effort is normal.      Breath sounds: Normal breath sounds. Abdominal:      Palpations: Abdomen is soft. Tenderness: There is no abdominal tenderness. Musculoskeletal:         General: Normal range of motion. Cervical back: Normal range of motion. Skin:     General: Skin is warm and dry. Capillary Refill: Capillary refill takes less than 2 seconds. Neurological:      General: No focal deficit present.       Mental Status: She is alert COVID-19. Patient has been educated regarding influenza infection. He will be discharged home with instruction to follow-up with primary care physician soon as possible. He has been prescribed antitussive medication. He has been instructed to come back to the emergency department he has any worsening symptoms including difficulty breathing or any other concerning symptoms        Clinical Diagnoses Addressed  1. Educated about COVID-19 virus infection    2. COVID-19    3. Upper respiratory tract infection due to COVID-19 virus            CONSULTS:  None    PROCEDURES:  Unless otherwise noted below, none     Procedures      FINAL IMPRESSION      1. Educated about COVID-19 virus infection    2. COVID-19    3. Upper respiratory tract infection due to COVID-19 virus          DISPOSITION/PLAN   DISPOSITION Decision To Discharge 12/22/2022 01:33:30 PM      PATIENT REFERRED TO:  Brittnay Kaufman MD  [address] [place] [address]  [phone]    Call in 1 day      DISCHARGE MEDICATIONS:  New Prescriptions    DEXTROMETHORPHAN-GUAIFENESIN (ROBITUSSIN COUGH+CHEST RISA DM)  MG/20ML LIQD    Take 20 mLs by mouth every 4 hours as needed (Cough)    GUAIFENESIN (MUCINEX) 600 MG EXTENDED RELEASE TABLET    Take 1 tablet by mouth 2 times daily for 10 days     Controlled Substances Monitoring:     No flowsheet data found.     Susan Barron MD (electronically signed)  Attending Emergency Physician            Susan Barron MD  12/22/22 0085

## 2025-04-09 ENCOUNTER — HOSPITAL ENCOUNTER (EMERGENCY)
Age: 70
Discharge: HOME | End: 2025-04-09
Attending: EMERGENCY MEDICINE

## 2025-04-09 ENCOUNTER — APPOINTMENT (OUTPATIENT)
Dept: ULTRASOUND IMAGING | Age: 70
End: 2025-04-09

## 2025-04-09 ENCOUNTER — APPOINTMENT (OUTPATIENT)
Dept: GENERAL RADIOLOGY | Age: 70
End: 2025-04-09

## 2025-04-09 VITALS
HEART RATE: 88 BPM | OXYGEN SATURATION: 95 % | HEIGHT: 66 IN | WEIGHT: 203.5 LBS | BODY MASS INDEX: 32.71 KG/M2 | DIASTOLIC BLOOD PRESSURE: 88 MMHG | TEMPERATURE: 98.7 F | RESPIRATION RATE: 18 BRPM | SYSTOLIC BLOOD PRESSURE: 179 MMHG

## 2025-04-09 DIAGNOSIS — M71.21 BAKER'S CYST OF KNEE, RIGHT: Primary | ICD-10-CM

## 2025-04-09 PROCEDURE — 99284 EMERGENCY DEPT VISIT MOD MDM: CPT

## 2025-04-09 PROCEDURE — 73564 X-RAY EXAM KNEE 4 OR MORE: CPT

## 2025-04-09 PROCEDURE — 93971 EXTREMITY STUDY: CPT

## 2025-04-09 RX ORDER — DEXAMETHASONE 4 MG/1
4 TABLET ORAL
Qty: 15 TABLET | Refills: 0 | Status: SHIPPED | OUTPATIENT
Start: 2025-04-09 | End: 2025-04-14

## 2025-04-09 RX ORDER — IBUPROFEN 600 MG/1
600 TABLET, FILM COATED ORAL 3 TIMES DAILY PRN
Qty: 30 TABLET | Refills: 0 | Status: SHIPPED | OUTPATIENT
Start: 2025-04-09

## 2025-04-09 RX ORDER — IBUPROFEN 400 MG/1
800 TABLET, FILM COATED ORAL ONCE
Status: DISCONTINUED | OUTPATIENT
Start: 2025-04-09 | End: 2025-04-09 | Stop reason: HOSPADM

## 2025-04-09 RX ORDER — IBUPROFEN 600 MG/1
600 TABLET, FILM COATED ORAL 3 TIMES DAILY PRN
Qty: 30 TABLET | Refills: 0 | Status: CANCELLED | OUTPATIENT
Start: 2025-04-09

## 2025-04-09 ASSESSMENT — PAIN SCALES - GENERAL: PAINLEVEL_OUTOF10: 6

## 2025-04-09 ASSESSMENT — LIFESTYLE VARIABLES
HOW MANY STANDARD DRINKS CONTAINING ALCOHOL DO YOU HAVE ON A TYPICAL DAY: PATIENT DOES NOT DRINK
HOW OFTEN DO YOU HAVE A DRINK CONTAINING ALCOHOL: NEVER

## 2025-04-09 ASSESSMENT — PAIN DESCRIPTION - LOCATION: LOCATION: LEG

## 2025-04-09 ASSESSMENT — PAIN - FUNCTIONAL ASSESSMENT: PAIN_FUNCTIONAL_ASSESSMENT: 0-10

## 2025-04-09 ASSESSMENT — PAIN DESCRIPTION - ORIENTATION: ORIENTATION: RIGHT

## 2025-04-09 ASSESSMENT — PAIN DESCRIPTION - PAIN TYPE: TYPE: ACUTE PAIN

## 2025-04-09 ASSESSMENT — PAIN DESCRIPTION - DESCRIPTORS: DESCRIPTORS: SORE

## 2025-04-09 ASSESSMENT — PAIN DESCRIPTION - FREQUENCY: FREQUENCY: CONTINUOUS

## 2025-04-09 NOTE — ED TRIAGE NOTES
Pt states Right Leg Lump on thigh. Pt says it is swelling gets better when she lays down. This has been going on for about a month worse in last 2 days.

## 2025-04-09 NOTE — ED PROVIDER NOTES
affect    RADIOLOGY/PROCEDURES    Vascular duplex lower extremity venous right   Final Result      XR KNEE RIGHT (MIN 4 VIEWS)   Final Result            ED COURSE & MEDICAL DECISION MAKING    Pertinent Labs & Imaging studies reviewed and interpreted. (See chart for details)    Vitals:    04/09/25 1420 04/09/25 1436   BP: (!) 210/107 (!) 179/88   Pulse: 88    Resp: 18    Temp: 98.7 °F (37.1 °C)    TempSrc: Temporal    SpO2: 95% 95%   Weight: 92.3 kg (203 lb 8 oz)    Height: 1.676 m (5' 6\")        Differential diagnosis: includes but not limited to Deep Vein Thrombosis, Arterial Injury/Ischemia, Fracture, Dislocation, Infection, Compartment Syndrome, Neurologic Deficit/Injury.    FINAL IMPRESSION    1. Baker's cyst of knee, right           Discharge home    (Please note that this note was completed with a voice recognition program.  Every attempt was made to edit the dictations, but inevitably there remain words that are mis-transcribed.)        Temo Bethea MD  04/09/25 [id number]